# Patient Record
Sex: MALE | Race: WHITE | Employment: FULL TIME | URBAN - NONMETROPOLITAN AREA
[De-identification: names, ages, dates, MRNs, and addresses within clinical notes are randomized per-mention and may not be internally consistent; named-entity substitution may affect disease eponyms.]

---

## 2017-01-09 ENCOUNTER — OFFICE VISIT (OUTPATIENT)
Dept: INTERNAL MEDICINE | Age: 57
End: 2017-01-09

## 2017-01-09 ENCOUNTER — TELEPHONE (OUTPATIENT)
Dept: INTERNAL MEDICINE | Age: 57
End: 2017-01-09

## 2017-01-09 VITALS
BODY MASS INDEX: 29.04 KG/M2 | OXYGEN SATURATION: 96 % | WEIGHT: 191.6 LBS | HEART RATE: 88 BPM | HEIGHT: 68 IN | DIASTOLIC BLOOD PRESSURE: 68 MMHG | SYSTOLIC BLOOD PRESSURE: 122 MMHG

## 2017-01-09 DIAGNOSIS — I83.90 VARICOSE VEIN OF LEG: Primary | ICD-10-CM

## 2017-01-09 DIAGNOSIS — R73.01 IMPAIRED FASTING GLUCOSE: ICD-10-CM

## 2017-01-09 DIAGNOSIS — Z12.5 SCREENING FOR PROSTATE CANCER: ICD-10-CM

## 2017-01-09 DIAGNOSIS — E78.5 DYSLIPIDEMIA: Primary | ICD-10-CM

## 2017-01-09 PROCEDURE — 99202 OFFICE O/P NEW SF 15 MIN: CPT | Performed by: NURSE PRACTITIONER

## 2017-01-09 ASSESSMENT — ENCOUNTER SYMPTOMS
COLOR CHANGE: 0
BACK PAIN: 0

## 2017-03-14 ENCOUNTER — OFFICE VISIT (OUTPATIENT)
Dept: INTERNAL MEDICINE | Age: 57
End: 2017-03-14
Payer: COMMERCIAL

## 2017-03-14 VITALS
WEIGHT: 193.4 LBS | SYSTOLIC BLOOD PRESSURE: 108 MMHG | HEIGHT: 68 IN | BODY MASS INDEX: 29.31 KG/M2 | HEART RATE: 76 BPM | DIASTOLIC BLOOD PRESSURE: 60 MMHG

## 2017-03-14 DIAGNOSIS — Z12.5 SCREENING FOR PROSTATE CANCER: ICD-10-CM

## 2017-03-14 DIAGNOSIS — Z11.4 SCREENING FOR HIV WITHOUT PRESENCE OF RISK FACTORS: ICD-10-CM

## 2017-03-14 DIAGNOSIS — Z00.00 ENCOUNTER FOR WELLNESS EXAMINATION: Primary | ICD-10-CM

## 2017-03-14 DIAGNOSIS — Z11.59 NEED FOR HEPATITIS C SCREENING TEST: ICD-10-CM

## 2017-03-14 DIAGNOSIS — S29.019A THORACIC MYOFASCIAL STRAIN, INITIAL ENCOUNTER: ICD-10-CM

## 2017-03-14 DIAGNOSIS — R73.01 IMPAIRED FASTING GLUCOSE: ICD-10-CM

## 2017-03-14 DIAGNOSIS — G89.29 CHRONIC PAIN OF LEFT KNEE: ICD-10-CM

## 2017-03-14 DIAGNOSIS — M25.562 CHRONIC PAIN OF LEFT KNEE: ICD-10-CM

## 2017-03-14 DIAGNOSIS — E78.5 DYSLIPIDEMIA: ICD-10-CM

## 2017-03-14 PROCEDURE — 99396 PREV VISIT EST AGE 40-64: CPT | Performed by: INTERNAL MEDICINE

## 2017-03-14 ASSESSMENT — ENCOUNTER SYMPTOMS
SORE THROAT: 0
SHORTNESS OF BREATH: 0
ABDOMINAL PAIN: 0
WHEEZING: 0
EYE PAIN: 0
DOUBLE VISION: 0
NAUSEA: 0
BLOOD IN STOOL: 0
VOMITING: 0
DIARRHEA: 0
BLURRED VISION: 0
CONSTIPATION: 0
EYE DISCHARGE: 0
COUGH: 0

## 2018-03-01 ENCOUNTER — HOSPITAL ENCOUNTER (OUTPATIENT)
Dept: LAB | Age: 58
Setting detail: SPECIMEN
Discharge: HOME | End: 2018-03-01
Payer: COMMERCIAL

## 2018-03-01 DIAGNOSIS — Z11.59 NEED FOR HEPATITIS C SCREENING TEST: ICD-10-CM

## 2018-03-01 DIAGNOSIS — Z12.5 SCREENING FOR PROSTATE CANCER: ICD-10-CM

## 2018-03-01 DIAGNOSIS — R73.01 IMPAIRED FASTING GLUCOSE: ICD-10-CM

## 2018-03-01 DIAGNOSIS — Z11.4 SCREENING FOR HIV WITHOUT PRESENCE OF RISK FACTORS: ICD-10-CM

## 2018-03-01 DIAGNOSIS — E78.5 DYSLIPIDEMIA: ICD-10-CM

## 2018-03-01 LAB
ANION GAP SERPL CALCULATED.3IONS-SCNC: 12 MMOL/L (ref 9–17)
BUN BLDV-MCNC: 22 MG/DL (ref 6–20)
BUN/CREAT BLD: 27 (ref 9–20)
CALCIUM SERPL-MCNC: 9.5 MG/DL (ref 8.6–10.4)
CHLORIDE BLD-SCNC: 101 MMOL/L (ref 98–107)
CHOLESTEROL/HDL RATIO: 3.5
CHOLESTEROL: 196 MG/DL
CO2: 29 MMOL/L (ref 20–31)
CREAT SERPL-MCNC: 0.81 MG/DL (ref 0.7–1.2)
GFR AFRICAN AMERICAN: >60 ML/MIN
GFR NON-AFRICAN AMERICAN: >60 ML/MIN
GFR SERPL CREATININE-BSD FRML MDRD: ABNORMAL ML/MIN/{1.73_M2}
GFR SERPL CREATININE-BSD FRML MDRD: ABNORMAL ML/MIN/{1.73_M2}
GLUCOSE BLD-MCNC: 112 MG/DL (ref 70–99)
HDLC SERPL-MCNC: 56 MG/DL
HEPATITIS C ANTIBODY: NONREACTIVE
HIV AG/AB: NONREACTIVE
LDL CHOLESTEROL: 124 MG/DL (ref 0–130)
POTASSIUM SERPL-SCNC: 4.6 MMOL/L (ref 3.7–5.3)
PROSTATE SPECIFIC ANTIGEN: 3.38 UG/L
SODIUM BLD-SCNC: 142 MMOL/L (ref 135–144)
TRIGL SERPL-MCNC: 81 MG/DL
VLDLC SERPL CALC-MCNC: NORMAL MG/DL (ref 1–30)

## 2018-03-01 PROCEDURE — 80048 BASIC METABOLIC PNL TOTAL CA: CPT

## 2018-03-01 PROCEDURE — 80061 LIPID PANEL: CPT

## 2018-03-01 PROCEDURE — G0103 PSA SCREENING: HCPCS

## 2018-03-01 PROCEDURE — 86803 HEPATITIS C AB TEST: CPT

## 2018-03-01 PROCEDURE — 87389 HIV-1 AG W/HIV-1&-2 AB AG IA: CPT

## 2018-03-01 PROCEDURE — 36415 COLL VENOUS BLD VENIPUNCTURE: CPT

## 2018-03-05 ENCOUNTER — OFFICE VISIT (OUTPATIENT)
Dept: INTERNAL MEDICINE | Age: 58
End: 2018-03-05
Payer: COMMERCIAL

## 2018-03-05 VITALS
SYSTOLIC BLOOD PRESSURE: 106 MMHG | BODY MASS INDEX: 30.46 KG/M2 | DIASTOLIC BLOOD PRESSURE: 74 MMHG | WEIGHT: 201 LBS | HEIGHT: 68 IN | HEART RATE: 84 BPM

## 2018-03-05 DIAGNOSIS — G89.29 CHRONIC PAIN OF LEFT KNEE: ICD-10-CM

## 2018-03-05 DIAGNOSIS — Z00.00 ENCOUNTER FOR WELLNESS EXAMINATION: Primary | ICD-10-CM

## 2018-03-05 DIAGNOSIS — L98.9 SKIN LESION: ICD-10-CM

## 2018-03-05 DIAGNOSIS — I83.92 VARICOSE VEINS OF LEFT LOWER EXTREMITY: ICD-10-CM

## 2018-03-05 DIAGNOSIS — M25.562 CHRONIC PAIN OF LEFT KNEE: ICD-10-CM

## 2018-03-05 DIAGNOSIS — R97.20 INCREASED PROSTATE SPECIFIC ANTIGEN (PSA) VELOCITY: ICD-10-CM

## 2018-03-05 DIAGNOSIS — E78.5 DYSLIPIDEMIA: ICD-10-CM

## 2018-03-05 DIAGNOSIS — H02.9 LESION OF EYELID: Primary | ICD-10-CM

## 2018-03-05 DIAGNOSIS — R73.01 IMPAIRED FASTING GLUCOSE: ICD-10-CM

## 2018-03-05 PROCEDURE — 99396 PREV VISIT EST AGE 40-64: CPT | Performed by: INTERNAL MEDICINE

## 2018-03-05 ASSESSMENT — PATIENT HEALTH QUESTIONNAIRE - PHQ9
1. LITTLE INTEREST OR PLEASURE IN DOING THINGS: 0
SUM OF ALL RESPONSES TO PHQ9 QUESTIONS 1 & 2: 1
SUM OF ALL RESPONSES TO PHQ QUESTIONS 1-9: 1
2. FEELING DOWN, DEPRESSED OR HOPELESS: 1

## 2018-03-05 NOTE — PROGRESS NOTES
The 10-year ASCVD risk score (Vivienne Pinto et al., 2013) is: 4.3%    Values used to calculate the score:      Age: 62 years      Sex: Male      Is Non- : No      Diabetic: No      Tobacco smoker: No      Systolic Blood Pressure: 640 mmHg      Is BP treated: No      HDL Cholesterol: 56 mg/dL      Total Cholesterol: 196 mg/dL

## 2018-03-07 NOTE — PROGRESS NOTES
Marleni Miranda  YOB: 1960    Date of Service:  3/5/2018      HPI:  Alex Husain was seen in the internal medicine office today for:  Chief Complaint  Patient presents with  · Varicose vein. · Knee pain. · Skin lesion. · Sugar problem. · Cholesterol problem. · PSA increase. · and continued evaluation and management of chronic medical problems. We talked about the blood sugar. It is up slightly at 112. He was somewhat overwhelmed with work with 2 big school projects and he feels he neglected himself. He did not do his spin class so his weight is up about 8 pounds and the sugar is up a little. We talked about the importance of diet and exercise. We talked about the cholesterol and looked at his 10-year risk. He is running just over 4%. We talked about diet and exercise. We reviewed the PSA which is up to 3.38. That is about a 0.86 increase over the last 12 months which is a little more increase in the PSA than we would really like to see although it is still well within the normal range. We talked about this. He has a lesion underneath his right eye that he wanted to have looked at that seems to be getting bigger to him. It is dark and just near the medial margin. He has had quite a bit of knee pain and he had the anterior cruciate ligament repaired about 18 years ago and wonders about having the knee looked at again. He has had a varicose vein in the left calf that is bothering him increasingly and he wanted to have that looked at. We talked about diet, exercise and weight loss. We reviewed vaccines. Review of Systems:  Constitutional:  Negative for chills, fever, and weight loss. HENT:  Negative for congestion, ear pain, and sore throat. Eyes:  Negative for blurred vision, double vision, pain and discharge. Respiratory:  Negative for cough, shortness of breath, and wheezing.   Cardiovascular:  Negative for chest pain, palpitations, and PND.  Gastrointestinal:  Negative for abdominal pain, blood in stool, constipation, diarrhea, nausea and vomiting. Genitourinary:  Negative for dysuria, frequency, and hematuria. Musculoskeletal:  Positive for knee pain and leg pain. Skin:  Negative for rash. Positive for skin lesion. Neurological:  Negative for tingling, sensory change, speech change, focal weakness, seizures, and headaches. Endo/Heme/Allergies:  Does not bruise/bleed easily. Psychiatric/Behavioral:  Negative for hallucinations and suicidal ideas. Patient Active Problem List   Diagnosis    Dyslipidemia    Impaired fasting glucose    Varicose veins of left lower extremity    Chronic pain of left knee    Increased prostate specific antigen (PSA) velocity       Medications:    Current Outpatient Prescriptions   Medication Sig Dispense Refill    ibuprofen (ADVIL;MOTRIN) 200 MG tablet Take 200 mg by mouth every 6 hours as needed.  Acetaminophen (TYLENOL PO) Take  by mouth as needed. No current facility-administered medications for this visit. Past Medical History:        Diagnosis Date    Carpal tunnel syndrome     EMG 11/07, bilaterally, right greater than left, as well as left ulnar neuropathy.  Dyslipidemia     Mild.  Impaired fasting glucose        Family Hx and Social Hx    family history includes Diabetes in his father; Heart Attack in his father; Prostate Cancer in his brother. History   Smoking Status    Never Smoker   Smokeless Tobacco    Never Used     History   Alcohol Use    Yes     Comment: approximately 1 beer per day       Vitals:    03/05/18 0854   BP: 106/74   Site: Right Arm   Position: Sitting   Cuff Size: Large Adult   Pulse: 84   Weight: 201 lb (91.2 kg)   Height: 5' 7.5\" (1.715 m)        Vitals Reviewed. Body mass index is 31.02 kg/m².      Wt Readings from Last 3 Encounters:   03/05/18 201 lb (91.2 kg)   03/14/17 193 lb 6.4 oz (87.7 kg)   01/09/17 191 lb 9.6 oz (86.9 kg)     BP Readings from Last 3 Encounters:   03/05/18 106/74   03/14/17 108/60   01/09/17 122/68       Physical Examination:  Constitutional:  He appears well-developed and well-nourished. No distress. HENT:  Head: Normocephalic and atraumatic. Right Ear:  External ear normal.  Left Ear:  External ear normal.  Nose:  Nose normal.  Mouth/Throat:  Oropharynx is clear and moist.  Eyes: Conjunctivae and EOM are normal.  Pupils are equal, round, and reactive to light. Right eye exhibits no discharge. Left eye exhibits no discharge. No scleral icterus. Neck:  Normal range of motion. Neck supple. No JVD present. No tracheal deviation present. No thyromegaly present. Cardiovascular:  Normal rate, normal heart sounds, and intact distal pulses. Exam reveals no gallop. Pulmonary/Chest:  Effort normal and breath sounds normal.  No respiratory distress. He has no wheezes. He has no rales. Abdominal:  Soft. Normal aorta and bowel sounds are normal.  He exhibits no distension and no mass. There is no hepatosplenomegaly. There is no tenderness. There is no rebound and no guarding. Digital Rectal Exam:  Prostate is only mildly enlarged. There is no nodule. There is no rectal mass. Stool is brown. Musculoskeletal:  Normal range of motion. He exhibits no edema or tenderness. Lymphadenopathy:    He has no cervical adenopathy. Neurological:  He is alert. He has normal strength. No cranial nerve deficit or sensory deficit. He exhibits normal muscle tone. Skin:  Skin is warm and dry. No rash noted. He is not diaphoretic. No pallor. He has a 3 mm lesion just beneath the margin of the eye medially on the right. It is darkly pigmented, fairly well demarcated. Psychiatric:  He has a normal mood and affect.   His behavior is normal.  Judgment normal.     Lab Results   Component Value Date    K 4.6 03/01/2018    CREATININE 0.81 03/01/2018    HCT 46.8 03/02/2017    PSA 3.38 03/01/2018    GLUCOSE 112 03/01/2018    CALCIUM

## 2018-03-29 ENCOUNTER — OFFICE VISIT (OUTPATIENT)
Dept: VASCULAR SURGERY | Age: 58
End: 2018-03-29
Payer: COMMERCIAL

## 2018-03-29 VITALS
HEIGHT: 66 IN | BODY MASS INDEX: 32.95 KG/M2 | DIASTOLIC BLOOD PRESSURE: 82 MMHG | HEART RATE: 82 BPM | SYSTOLIC BLOOD PRESSURE: 110 MMHG | WEIGHT: 205 LBS

## 2018-03-29 DIAGNOSIS — I83.92 VARICOSE VEINS OF LEFT LOWER EXTREMITY: Primary | ICD-10-CM

## 2018-03-29 PROCEDURE — 1036F TOBACCO NON-USER: CPT | Performed by: SURGERY

## 2018-03-29 PROCEDURE — 99203 OFFICE O/P NEW LOW 30 MIN: CPT | Performed by: SURGERY

## 2018-03-29 PROCEDURE — 3017F COLORECTAL CA SCREEN DOC REV: CPT | Performed by: SURGERY

## 2018-03-29 PROCEDURE — G8427 DOCREV CUR MEDS BY ELIG CLIN: HCPCS | Performed by: SURGERY

## 2018-03-29 PROCEDURE — G8417 CALC BMI ABV UP PARAM F/U: HCPCS | Performed by: SURGERY

## 2018-03-29 PROCEDURE — G8484 FLU IMMUNIZE NO ADMIN: HCPCS | Performed by: SURGERY

## 2018-03-29 NOTE — PROGRESS NOTES
[id number] [facility]  [facility] VASCULAR SURG  [address] [facility] [address]  [address]  1960  62 y. o.male       Chief Complaint:  Chief Complaint   Patient presents with    Other     vericose veins left lower extremity        History of present Illness:  Pt is here today for evaluation and treatment of a left leg varicose vein. This is a 62 yr old male with several years of varicose veins in both legs, more prominent on the left. He notices aching and fatigue in the leg and has worn compression stockings. He feels that his left leg holds back his ability to exercise effectively. He has no prior history of DVT. We discussed that varicose veins are typically due to venous reflux disease. He does have a strong family history of heart attack and these same veins are used in bypass surgery. Any venous procedures will not affect his prior right knee surgery. Past Medical History:  has a past medical history of Carpal tunnel syndrome; Dyslipidemia; and Impaired fasting glucose. Past Surgical History:   Past Surgical History:   Procedure Laterality Date    ANTERIOR CRUCIATE LIGAMENT REPAIR Left 02/99    COLONOSCOPY  08/13/60    OTHER SURGICAL HISTORY  06/23/78    Verrucae vulgaris, palm of right hand. Social History:  reports that he has never smoked. He has never used smokeless tobacco. He reports that he drinks alcohol. Family History: family history includes Diabetes in his father; Heart Attack in his father; Prostate Cancer in his brother.     Review of Systems:   Constitutional:  negative for  fevers, chills, sweats, fatigue, and weight loss  HEENT:  negative for vision or hearing changes,   Respiratory:  negative for shortness of breath, cough, or congestion  Cardiovascular:  negative for  chest pain, palpitations  Gastrointestinal:  negative for nausea, vomiting, diarrhea, constipation, abdominal

## 2018-04-18 ENCOUNTER — HOSPITAL ENCOUNTER (OUTPATIENT)
Dept: INTERVENTIONAL RADIOLOGY/VASCULAR | Age: 58
Discharge: HOME | End: 2018-04-20
Payer: COMMERCIAL

## 2018-04-18 DIAGNOSIS — I83.893 VARICOSE VEINS OF BILATERAL LOWER EXTREMITIES WITH OTHER COMPLICATIONS: ICD-10-CM

## 2018-04-18 PROCEDURE — 93970 EXTREMITY STUDY: CPT

## 2018-04-26 ENCOUNTER — OFFICE VISIT (OUTPATIENT)
Dept: VASCULAR SURGERY | Age: 58
End: 2018-04-26
Payer: COMMERCIAL

## 2018-04-26 VITALS
WEIGHT: 203 LBS | DIASTOLIC BLOOD PRESSURE: 72 MMHG | BODY MASS INDEX: 32.62 KG/M2 | SYSTOLIC BLOOD PRESSURE: 100 MMHG | HEIGHT: 66 IN | HEART RATE: 60 BPM

## 2018-04-26 DIAGNOSIS — I87.2 VENOUS INSUFFICIENCY (CHRONIC) (PERIPHERAL): ICD-10-CM

## 2018-04-26 DIAGNOSIS — I83.92 VARICOSE VEINS OF LEFT LOWER EXTREMITY: Primary | ICD-10-CM

## 2018-04-26 PROCEDURE — 99213 OFFICE O/P EST LOW 20 MIN: CPT | Performed by: SURGERY

## 2018-04-26 PROCEDURE — G8427 DOCREV CUR MEDS BY ELIG CLIN: HCPCS | Performed by: SURGERY

## 2018-04-26 PROCEDURE — G8417 CALC BMI ABV UP PARAM F/U: HCPCS | Performed by: SURGERY

## 2018-04-26 PROCEDURE — 1036F TOBACCO NON-USER: CPT | Performed by: SURGERY

## 2018-04-26 PROCEDURE — 3017F COLORECTAL CA SCREEN DOC REV: CPT | Performed by: SURGERY

## 2018-08-06 ENCOUNTER — OFFICE VISIT (OUTPATIENT)
Dept: INTERNAL MEDICINE | Age: 58
End: 2018-08-06
Payer: COMMERCIAL

## 2018-08-06 VITALS
BODY MASS INDEX: 29.86 KG/M2 | WEIGHT: 197 LBS | HEART RATE: 84 BPM | DIASTOLIC BLOOD PRESSURE: 88 MMHG | SYSTOLIC BLOOD PRESSURE: 122 MMHG | HEIGHT: 68 IN

## 2018-08-06 DIAGNOSIS — M75.81 ROTATOR CUFF TENDINITIS, RIGHT: ICD-10-CM

## 2018-08-06 DIAGNOSIS — G89.29 CHRONIC PAIN OF LEFT KNEE: ICD-10-CM

## 2018-08-06 DIAGNOSIS — M25.562 CHRONIC PAIN OF LEFT KNEE: ICD-10-CM

## 2018-08-06 DIAGNOSIS — R73.01 IMPAIRED FASTING GLUCOSE: ICD-10-CM

## 2018-08-06 DIAGNOSIS — E78.5 DYSLIPIDEMIA: ICD-10-CM

## 2018-08-06 DIAGNOSIS — I83.92 VARICOSE VEINS OF LEFT LOWER EXTREMITY: ICD-10-CM

## 2018-08-06 DIAGNOSIS — R97.20 INCREASED PROSTATE SPECIFIC ANTIGEN (PSA) VELOCITY: ICD-10-CM

## 2018-08-06 DIAGNOSIS — H81.10 BENIGN PAROXYSMAL POSITIONAL VERTIGO, UNSPECIFIED LATERALITY: Primary | ICD-10-CM

## 2018-08-06 PROCEDURE — G8417 CALC BMI ABV UP PARAM F/U: HCPCS | Performed by: INTERNAL MEDICINE

## 2018-08-06 PROCEDURE — 99214 OFFICE O/P EST MOD 30 MIN: CPT | Performed by: INTERNAL MEDICINE

## 2018-08-06 PROCEDURE — 1036F TOBACCO NON-USER: CPT | Performed by: INTERNAL MEDICINE

## 2018-08-06 PROCEDURE — 3017F COLORECTAL CA SCREEN DOC REV: CPT | Performed by: INTERNAL MEDICINE

## 2018-08-06 PROCEDURE — G8427 DOCREV CUR MEDS BY ELIG CLIN: HCPCS | Performed by: INTERNAL MEDICINE

## 2018-08-06 RX ORDER — NAPROXEN 500 MG/1
500 TABLET ORAL 2 TIMES DAILY WITH MEALS
Qty: 28 TABLET | Refills: 0 | Status: SHIPPED | OUTPATIENT
Start: 2018-08-06 | End: 2019-03-11 | Stop reason: ALTCHOICE

## 2018-08-09 ENCOUNTER — HOSPITAL ENCOUNTER (OUTPATIENT)
Dept: PHYSICAL THERAPY | Age: 58
Setting detail: THERAPIES SERIES
Discharge: HOME | End: 2018-08-09
Payer: COMMERCIAL

## 2018-08-09 PROCEDURE — G8982 BODY POS GOAL STATUS: HCPCS

## 2018-08-09 PROCEDURE — 97162 PT EVAL MOD COMPLEX 30 MIN: CPT

## 2018-08-09 PROCEDURE — 97112 NEUROMUSCULAR REEDUCATION: CPT

## 2018-08-09 PROCEDURE — G8981 BODY POS CURRENT STATUS: HCPCS

## 2018-08-09 NOTE — PROGRESS NOTES
Physical Therapy  Initial Assessment  Date: 2018  Patient Name: Abiodun Maynard  MRN: [id number]  : 1960          Restrictions       Subjective   General  Referring Practitioner: Gustavo Sharma MD  Referral Date : 18  Diagnosis: H81.10 (ICD-10-CM) - Benign paroxysmal positional vertigo, unspecified laterality  General Comment  Comments: Dizziness 2/10 current, 10/10 with nausea. Patient reporting having near falls. PT Visit Information  PT Insurance Information: [employer] [address]  Subjective  Subjective: Insidious onset approx 2 weeks ago. Patient ntoing increased dizziness with sitting/standing and getting up. Difficulty with laying on Right side. Patient noting looking up for prolonged period and looking down. 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 Vision/Hearing       Orientation  Orientation  Overall Orientation Status: Within Functional Limits    Social/Functional History  Social/Functional History  Type of occupation:    Objective     Observation/Palpation  Posture: Fair               Additional Measures  Special Tests: +smooth pursuit, +saccades , Left [place] slight incresae in dizziness-no nystagmus noted, Right [place] late onset approx 15 sec and had moderate nystagmus with torsion. Balance  Comments: EO on firm and foam min sway, EC on firm min to mod sway, EC on Foam mod sway                          Assessment   Conditions Requiring Skilled Therapeutic Intervention  Body structures, Functions, Activity limitations: Decreased functional mobility ; Decreased ADL status; Decreased balance  Assessment: + Right [place] greater than Left for BPPV, patient also has + VOR testing this date.    Prognosis: Good  Decision Making: Medium Complexity  REQUIRES PT FOLLOW UP: Yes  Activity Tolerance  Activity Tolerance: Patient Tolerated treatment well         Plan   Plan  Times per week: 1-2x/week   Plan weeks: 6weeks   Current Treatment Recommendations: Balance Training, Neuromuscular Re-education, Patient/Caregiver Education & Training, Home Exercise Program, Safety Education & Training    G-Code  PT G-Codes  Functional Assessment Tool Used: DHI   Score: 38/100=38% Disability   Functional Limitation: Changing and maintaining body position  Changing and Maintaining Body Position Current Status (): At least 20 percent but less than 40 percent impaired, limited or restricted  Changing and Maintaining Body Position Goal Status (): At least 1 percent but less than 20 percent impaired, limited or restricted    OutComes Score                                           Goals  Short term goals  Time Frame for Short term goals: 3 weeks   Short term goal 1: Perform CRM   Long term goals  Time Frame for Long term goals : 6weeks  Long term goal 1: [place] in HEP   Long term goal 2: Patient to be able to lay on the Right Side without dizziness. 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 Long term goal 4: DHI 20/100 or less to improve overall function.    Patient Goals   Patient goals : Get Rid of Dizziness        Therapy Time   Individual Concurrent Group Co-treatment   Time In 0710         Time Out 0750         Minutes 40         Timed Code Treatment Minutes: 8 Minutes       Migdalia Dos Santos, PT

## 2018-08-09 NOTE — FLOWSHEET NOTE
kinesthetic sense, posture, motor skill, proprioception. (59671)    Therapeutic Activities:     [] Therapeutic activities, direct (one-on-one) patient contact (use of dynamic activities to improve functional performance). (10235)    Gait:   [] Provided training and instruction to the patient for ambulation re-education. (60317)    Self-Care/ADL's  [] Self-care/home management training and compensatory training, meal preparation, safety procedures, and instructions in use of assistive technology devices/adaptive equipment, direct one-on-one contact. (33773)    Home Exercise Program:     [] Reviewed/Progressed HEP activities related to strengthening, flexibility, endurance, ROM. (97957)  [] Reviewed/Progressed HEP activities related to improving balance, coordination, kinesthetic sense, posture, motor skill, proprioception.  (04281)    Manual Treatments:    [] Provided manual therapy to mobilize soft tissue/joints for the purpose of modulating pain, promoting relaxation,  increasing ROM, reducing/eliminating soft tissue swelling/inflammation/restriction, improving soft tissue extensibility. (17692)    Service Based Modalities:      Timed Code Treatment Minutes:   8'    Total Treatment Minutes:       Treatment/Activity Tolerance:  [] Patient tolerated treatment well [] Patient limited by fatique  [] Patient limited by pain  [] Patient limited by other medical complications  [x] Other: Dizziness  Prognosis: [x] Good [] Fair  [] Poor    Patient Requires Follow-up: [x] Yes  [] No      Goals:  Short term goals  Time Frame for Short term goals: 3 weeks   Short term goal 1: Perform CRM     Long term goals  Time Frame for Long term goals : 6weeks  Long term goal 1: [place] in HEP   Long term goal 2: Patient to be able to lay on the Right Side without dizziness. 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   Long term goal 4: DHI 20/100 or less to improve overall function.            Plan:   [] Continue per plan of care [] Alter current plan (see comments)  [x] Plan of care initiated [] Hold pending MD visit [] Discharge  Plan for Next Session:  Assess Right [facility].   Electronically signed by:  José Royal

## 2018-08-14 ENCOUNTER — HOSPITAL ENCOUNTER (OUTPATIENT)
Dept: PHYSICAL THERAPY | Age: 58
Setting detail: THERAPIES SERIES
Discharge: HOME | End: 2018-08-14
Payer: COMMERCIAL

## 2018-08-14 DIAGNOSIS — M25.512 LEFT SHOULDER PAIN, UNSPECIFIED CHRONICITY: Primary | ICD-10-CM

## 2018-08-14 PROCEDURE — 97112 NEUROMUSCULAR REEDUCATION: CPT

## 2018-08-14 NOTE — FLOWSHEET NOTE
Physical Therapy Daily Treatment Note    Date:  2018    Patient Name:  Charito Scott    :  1960  MRN: [id number]  Restrictions/Precautions:     Medical/Treatment Diagnosis Information:   · Diagnosis: H81.10 (ICD-10-CM) - Benign paroxysmal positional vertigo, unspecified laterality     Insurance/Certification information:  PT Insurance Information: [employer] [address]  Physician Information:  Referring Practitioner: Reza Garcia MD  Plan of care signed (Y/N):  n  Visit# / total visits: 1 /10  Pain level: /10     G-Code (if applicable):      Date G-Code Applied:  18  PT G-Codes  Functional Assessment Tool Used: DHI   Score: 38/100=38% Disability   Functional Limitation: Changing and maintaining body position  Changing and Maintaining Body Position Current Status (): At least 20 percent but less than 40 percent impaired, limited or restricted  Changing and Maintaining Body Position Goal Status (): At least 1 percent but less than 20 percent impaired, limited or restricted    Time In:1335  Time Out:1350  Progress Note: [x]  Yes  []  No  Next due by: Visit #10      Subjective:  Patient reporting 95% overall imrpovement since eval.  Patient noting no spinning sensation since the day after the eval.    Objective: VEDA per flow sheet. Patient had - Malou Ghotra this date. Performed Right Bradt Daroff with slight symptoms upon return to sitting. Patient states\"feels like his normal\". Patient educated on HEP, patient to complete 2x2 x 1 week, then 1x1 x 1 week, then 1x1 every other day x 1 week, then 1x/week.      Observation:   Test measurements:      Exercises:   Exercise/Equipment Resistance/Repetitions Other comments        Right LANETTE Rojas  2x2 on Right                                                             [] Provided verbal/tactile cueing for activities related to strengthening, flexibility, endurance, ROM. (71375)  [x] Provided verbal/tactile cueing for activities related to improving balance, coordination, kinesthetic sense, posture, motor skill, proprioception. (24293)    Therapeutic Activities:     [] Therapeutic activities, direct (one-on-one) patient contact (use of dynamic activities to improve functional performance). (03420)    Gait:   [] Provided training and instruction to the patient for ambulation re-education. (85847)    Self-Care/ADL's  [] Self-care/home management training and compensatory training, meal preparation, safety procedures, and instructions in use of assistive technology devices/adaptive equipment, direct one-on-one contact. (81778)    Home Exercise Program:     [] Reviewed/Progressed HEP activities related to strengthening, flexibility, endurance, ROM. (70020)  [] Reviewed/Progressed HEP activities related to improving balance, coordination, kinesthetic sense, posture, motor skill, proprioception.  (02667)    Manual Treatments:    [] Provided manual therapy to mobilize soft tissue/joints for the purpose of modulating pain, promoting relaxation,  increasing ROM, reducing/eliminating soft tissue swelling/inflammation/restriction, improving soft tissue extensibility.  (87879)    Service Based Modalities:      Timed Code Treatment Minutes:   15'    Total Treatment Minutes:   15'    Treatment/Activity Tolerance:  [x] Patient tolerated treatment well [] Patient limited by fatique  [] Patient limited by pain  [] Patient limited by other medical complications  [] Other: Dizziness  Prognosis: [x] Good [] Fair  [] Poor    Patient Requires Follow-up: [x] Yes  [x] No      Goals:  Short term goals  Time Frame for Short term goals: 3 weeks   Short term goal 1: Perform CRM (met)     Long term goals  Time Frame for Long term goals : 6weeks  Long term goal 1: [place] in HEP (met)  Long term goal 2: Patient to be able to lay on the Right Side without dizziness.(met)  Long term goal 3: Improved Balance to 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 Plan:   [] Continue per plan of care [] Alter current plan (see comments)  [x] Plan of care initiated [] Hold pending MD visit [] Discharge  Plan for Next Session:  Assess Right Shoulder next session, if receive new order. Hold x 2 weeks for vertigo.   Electronically signed by:  Alicia Osuna

## 2018-08-16 ENCOUNTER — HOSPITAL ENCOUNTER (OUTPATIENT)
Dept: PHYSICAL THERAPY | Age: 58
Setting detail: THERAPIES SERIES
Discharge: HOME | End: 2018-08-16
Payer: COMMERCIAL

## 2018-08-16 NOTE — PROGRESS NOTES
Physical Therapy    Outpatient Physical Therapy    [x] [place]  Phone: [phone]  Fax: [phone]      [] [place]  Phone: [phone]  Fax: [phone]    Physical Therapy  Cancellation/No-show Note  Patient Name:  Liu Horan  :  1960   Date:  2018  Cancelled visits to date: 1  No-shows to date:      For today's appointment patient:  [x]  Cancelled  []  Rescheduled appointment  []  No-show     Reason given by patient:  []  Patient ill  []  Conflicting appointment  []  No transportation    []  Conflict with work  []  No reason given  [x]  Other:   put him on another program.   Comments:      Electronically signed by: Luis Angel Contreras PT

## 2018-09-17 ENCOUNTER — HOSPITAL ENCOUNTER (OUTPATIENT)
Dept: LAB | Age: 58
Setting detail: SPECIMEN
Discharge: HOME | End: 2018-09-17
Payer: COMMERCIAL

## 2018-09-17 DIAGNOSIS — R73.01 IMPAIRED FASTING GLUCOSE: ICD-10-CM

## 2018-09-17 DIAGNOSIS — R97.20 INCREASED PROSTATE SPECIFIC ANTIGEN (PSA) VELOCITY: ICD-10-CM

## 2018-09-17 LAB
ESTIMATED AVERAGE GLUCOSE: 120 MG/DL
GLUCOSE FASTING: 98 MG/DL (ref 70–99)
HBA1C MFR BLD: 5.8 % (ref 4.8–5.9)
PROSTATE SPECIFIC ANTIGEN: 3.35 UG/L

## 2018-09-17 PROCEDURE — 36415 COLL VENOUS BLD VENIPUNCTURE: CPT

## 2018-09-17 PROCEDURE — 82947 ASSAY GLUCOSE BLOOD QUANT: CPT

## 2018-09-17 PROCEDURE — 83036 HEMOGLOBIN GLYCOSYLATED A1C: CPT

## 2018-09-17 PROCEDURE — 84153 ASSAY OF PSA TOTAL: CPT

## 2018-11-29 NOTE — DISCHARGE SUMMARY
[facility]    [x] [place]  Phone: [phone]  Fax: [phone]      [] [place]  Phone: [phone]  Fax: [phone]    Physical Therapy Discharge Note  Date: 2018        Patient Name:  Theresa Tracy    :  1960  MRN: [id number]  Medical/Treatment Diagnosis Information:   · Diagnosis: H81.10 (ICD-10-CM) - Benign paroxysmal positional vertigo, unspecified laterality  ·   Insurance/Certification information:  PT Insurance Information: [employer] [address]  Physician Information:  Referring Practitioner: Natalya Iraheta MD  Plan of care signed (Y/N):  n  Visit# / total visits: 1 /10  Pain level:      /10            G-Code (if applicable):                                             Date G-Code Applied:  18  PT G-Codes  Functional Assessment Tool Used: DHI   Score: 38/100=38% Disability   Functional Limitation: Changing and maintaining body position  Changing and Maintaining Body Position Current Status (): At least 20 percent but less than 40 percent impaired, limited or restricted  Changing and Maintaining Body Position Goal Status (): At least 1 percent but less than 20 percent impaired, limited or restricted     Time Period for Report: 18-18   Cancels/No-shows to date:  1    Plan of Care/Treatment to date:  [] Therapeutic Exercise    [] Modalities:  [] Therapeutic Activity     [] Ultrasound  [] Electrical Stimulation  [] Gait Training      [] Cervical Traction    [] Lumbar Traction  [x] Neuromuscular Re-education  [] Cold/hotpack [] Iontophoresis  [x] Instruction in HEP      Other:  [] Manual Therapy       []    [] Aquatic Therapy       []                    ? Subjective:    Patient reporting 95% overall imrpovement since eval.  Patient noting no spinning sensation since the day after the eval.    Objective: VEDA per flow sheet. Patient had Jamshid Barbaraon Speaks this date.   Performed Right Romy Josafat with slight symptoms upon return to sitting. Patient states\"feels like his normal\". Patient educated on HEP, patient to complete 2x2 x 1 week, then 1x1 x 1 week, then 1x1 every other day x 1 week, then 1x/week. Assessment:  Patient was placed on Hold x 2 weeks for vertigo and no return DC PT Services.       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               Discharge Prognosis: [] Excellent [x] Good [] Fair  [] Poor     Goal Status:  [] Achieved [x] Partially Achieved  [] Not Achieved       Electronically signed by:  Beth Pulido, PT

## 2019-03-11 ENCOUNTER — OFFICE VISIT (OUTPATIENT)
Dept: INTERNAL MEDICINE | Age: 59
End: 2019-03-11
Payer: COMMERCIAL

## 2019-03-11 VITALS
HEART RATE: 84 BPM | BODY MASS INDEX: 30.77 KG/M2 | HEIGHT: 68 IN | DIASTOLIC BLOOD PRESSURE: 76 MMHG | SYSTOLIC BLOOD PRESSURE: 122 MMHG | WEIGHT: 203 LBS

## 2019-03-11 DIAGNOSIS — G89.29 CHRONIC PAIN OF LEFT KNEE: Primary | ICD-10-CM

## 2019-03-11 DIAGNOSIS — M25.512 LEFT SHOULDER PAIN, UNSPECIFIED CHRONICITY: ICD-10-CM

## 2019-03-11 DIAGNOSIS — E78.5 DYSLIPIDEMIA: ICD-10-CM

## 2019-03-11 DIAGNOSIS — M25.562 CHRONIC PAIN OF LEFT KNEE: Primary | ICD-10-CM

## 2019-03-11 DIAGNOSIS — R73.01 IMPAIRED FASTING GLUCOSE: ICD-10-CM

## 2019-03-11 PROCEDURE — 99213 OFFICE O/P EST LOW 20 MIN: CPT | Performed by: INTERNAL MEDICINE

## 2019-03-11 RX ORDER — NAPROXEN SODIUM 220 MG
220 TABLET ORAL 2 TIMES DAILY PRN
COMMUNITY
End: 2021-01-25

## 2019-03-11 ASSESSMENT — PATIENT HEALTH QUESTIONNAIRE - PHQ9
SUM OF ALL RESPONSES TO PHQ QUESTIONS 1-9: 0
SUM OF ALL RESPONSES TO PHQ QUESTIONS 1-9: 0
SUM OF ALL RESPONSES TO PHQ9 QUESTIONS 1 & 2: 0
2. FEELING DOWN, DEPRESSED OR HOPELESS: 0
1. LITTLE INTEREST OR PLEASURE IN DOING THINGS: 0

## 2019-03-21 ENCOUNTER — HOSPITAL ENCOUNTER (OUTPATIENT)
Dept: LAB | Age: 59
Discharge: HOME | End: 2019-03-21
Payer: COMMERCIAL

## 2019-03-21 DIAGNOSIS — G89.29 CHRONIC PAIN OF LEFT KNEE: ICD-10-CM

## 2019-03-21 DIAGNOSIS — M25.562 CHRONIC PAIN OF LEFT KNEE: ICD-10-CM

## 2019-03-21 DIAGNOSIS — E78.5 DYSLIPIDEMIA: ICD-10-CM

## 2019-03-21 DIAGNOSIS — R73.01 IMPAIRED FASTING GLUCOSE: ICD-10-CM

## 2019-03-21 LAB
ABSOLUTE EOS #: 0.1 K/UL (ref 0–0.4)
ABSOLUTE IMMATURE GRANULOCYTE: ABNORMAL K/UL (ref 0–0.3)
ABSOLUTE LYMPH #: 0.7 K/UL (ref 1–4.8)
ABSOLUTE MONO #: 0.4 K/UL (ref 0.1–1.2)
ALBUMIN SERPL-MCNC: 4.7 G/DL (ref 3.5–5.2)
ALBUMIN/GLOBULIN RATIO: 1.7 (ref 1–2.5)
ALP BLD-CCNC: 71 U/L (ref 40–129)
ALT SERPL-CCNC: 17 U/L (ref 5–41)
ANION GAP SERPL CALCULATED.3IONS-SCNC: 10 MMOL/L (ref 9–17)
AST SERPL-CCNC: 15 U/L
BASOPHILS # BLD: 1 % (ref 0–2)
BASOPHILS ABSOLUTE: 0 K/UL (ref 0–0.2)
BILIRUB SERPL-MCNC: 0.59 MG/DL (ref 0.3–1.2)
BUN BLDV-MCNC: 21 MG/DL (ref 6–20)
BUN/CREAT BLD: 26 (ref 9–20)
CALCIUM SERPL-MCNC: 9.5 MG/DL (ref 8.6–10.4)
CHLORIDE BLD-SCNC: 105 MMOL/L (ref 98–107)
CHOLESTEROL/HDL RATIO: 3.3
CHOLESTEROL: 163 MG/DL
CO2: 29 MMOL/L (ref 20–31)
CREAT SERPL-MCNC: 0.81 MG/DL (ref 0.7–1.2)
DIFFERENTIAL TYPE: ABNORMAL
EOSINOPHILS RELATIVE PERCENT: 2 % (ref 1–8)
GFR AFRICAN AMERICAN: >60 ML/MIN
GFR NON-AFRICAN AMERICAN: >60 ML/MIN
GFR SERPL CREATININE-BSD FRML MDRD: ABNORMAL ML/MIN/{1.73_M2}
GFR SERPL CREATININE-BSD FRML MDRD: ABNORMAL ML/MIN/{1.73_M2}
GLUCOSE BLD-MCNC: 112 MG/DL (ref 70–99)
HCT VFR BLD CALC: 47 % (ref 41–53)
HDLC SERPL-MCNC: 50 MG/DL
HEMOGLOBIN: 15.4 G/DL (ref 13.5–17.5)
IMMATURE GRANULOCYTES: ABNORMAL %
LDL CHOLESTEROL: 98 MG/DL (ref 0–130)
LYMPHOCYTES # BLD: 15 % (ref 15–43)
MCH RBC QN AUTO: 29 PG (ref 26–34)
MCHC RBC AUTO-ENTMCNC: 32.8 G/DL (ref 31–37)
MCV RBC AUTO: 88.6 FL (ref 80–100)
MONOCYTES # BLD: 8 % (ref 6–14)
NRBC AUTOMATED: ABNORMAL PER 100 WBC
PDW BLD-RTO: 13.5 % (ref 11–14.5)
PLATELET # BLD: 267 K/UL (ref 140–450)
PLATELET ESTIMATE: ABNORMAL
PMV BLD AUTO: 8.4 FL (ref 6–12)
POTASSIUM SERPL-SCNC: 4.9 MMOL/L (ref 3.7–5.3)
RBC # BLD: 5.31 M/UL (ref 4.5–5.9)
RBC # BLD: ABNORMAL 10*6/UL
SEG NEUTROPHILS: 74 % (ref 44–74)
SEGMENTED NEUTROPHILS ABSOLUTE COUNT: 3.6 K/UL (ref 1.8–7.7)
SODIUM BLD-SCNC: 144 MMOL/L (ref 135–144)
TOTAL PROTEIN: 7.5 G/DL (ref 6.4–8.3)
TRIGL SERPL-MCNC: 77 MG/DL
VLDLC SERPL CALC-MCNC: NORMAL MG/DL (ref 1–30)
WBC # BLD: 4.9 K/UL (ref 3.5–11)
WBC # BLD: ABNORMAL 10*3/UL

## 2019-03-21 PROCEDURE — 85025 COMPLETE CBC W/AUTO DIFF WBC: CPT

## 2019-03-21 PROCEDURE — 80061 LIPID PANEL: CPT

## 2019-03-21 PROCEDURE — 36415 COLL VENOUS BLD VENIPUNCTURE: CPT

## 2019-03-21 PROCEDURE — 80053 COMPREHEN METABOLIC PANEL: CPT

## 2019-09-26 ENCOUNTER — OFFICE VISIT (OUTPATIENT)
Dept: VASCULAR SURGERY | Age: 59
End: 2019-09-26
Payer: COMMERCIAL

## 2019-09-26 VITALS
HEIGHT: 66 IN | WEIGHT: 187.8 LBS | DIASTOLIC BLOOD PRESSURE: 72 MMHG | BODY MASS INDEX: 30.18 KG/M2 | SYSTOLIC BLOOD PRESSURE: 104 MMHG | HEART RATE: 76 BPM

## 2019-09-26 DIAGNOSIS — I87.2 VENOUS INSUFFICIENCY (CHRONIC) (PERIPHERAL): Primary | ICD-10-CM

## 2019-09-26 PROCEDURE — 99213 OFFICE O/P EST LOW 20 MIN: CPT | Performed by: SURGERY

## 2019-09-26 NOTE — PROGRESS NOTES
femoral 2+   R popliteal 2+ L popliteal 2+   R posterior tibial 2+ L posterior tibial 2+   R dorsalis pedis 2+ L dorsalis pedis 2+     Labs:   Lab Results   Component Value Date    WBC 4.9 03/21/2019    HGB 15.4 03/21/2019    HCT 47.0 03/21/2019    MCV 88.6 03/21/2019     03/21/2019     Lab Results   Component Value Date     03/21/2019    K 4.9 03/21/2019     03/21/2019    CO2 29 03/21/2019    BUN 21 03/21/2019    CREATININE 0.81 03/21/2019    GLUCOSE 112 03/21/2019    CALCIUM 9.5 03/21/2019     Assessment:  1. 62 yr old male with bilateral varicose veins    1. Venous insufficiency (chronic) (peripheral)        Plan:   Plan for endovenous ablation of left GSV with phlebectomies. No orders of the defined types were placed in this encounter.           Electronically signed by Tahmina Becker MD on 9/26/2019 at 2:05 PM     Copy sent to Dr. Dalila Arias MD

## 2019-11-12 ENCOUNTER — OFFICE VISIT (OUTPATIENT)
Dept: INTERNAL MEDICINE | Age: 59
End: 2019-11-12
Payer: COMMERCIAL

## 2019-11-12 VITALS
HEART RATE: 76 BPM | SYSTOLIC BLOOD PRESSURE: 122 MMHG | DIASTOLIC BLOOD PRESSURE: 72 MMHG | HEIGHT: 66 IN | WEIGHT: 191 LBS | RESPIRATION RATE: 16 BRPM | BODY MASS INDEX: 30.7 KG/M2

## 2019-11-12 DIAGNOSIS — R73.01 IMPAIRED FASTING GLUCOSE: Primary | ICD-10-CM

## 2019-11-12 DIAGNOSIS — R97.20 INCREASED PROSTATE SPECIFIC ANTIGEN (PSA) VELOCITY: ICD-10-CM

## 2019-11-12 DIAGNOSIS — E78.5 DYSLIPIDEMIA: ICD-10-CM

## 2019-11-12 PROCEDURE — 99214 OFFICE O/P EST MOD 30 MIN: CPT | Performed by: INTERNAL MEDICINE

## 2020-01-02 ENCOUNTER — TELEPHONE (OUTPATIENT)
Dept: VASCULAR SURGERY | Age: 60
End: 2020-01-02

## 2020-01-02 NOTE — TELEPHONE ENCOUNTER
Please call patient back at [phone]. Wants to speak with nurse first in regards to scheduling varicose vein surgery.

## 2020-01-31 ENCOUNTER — HOSPITAL ENCOUNTER (OUTPATIENT)
Dept: PREADMISSION TESTING | Age: 60
Discharge: HOME | End: 2020-02-04
Payer: COMMERCIAL

## 2020-01-31 VITALS
HEIGHT: 68 IN | WEIGHT: 200 LBS | DIASTOLIC BLOOD PRESSURE: 87 MMHG | OXYGEN SATURATION: 99 % | HEART RATE: 70 BPM | RESPIRATION RATE: 16 BRPM | BODY MASS INDEX: 30.31 KG/M2 | SYSTOLIC BLOOD PRESSURE: 129 MMHG

## 2020-01-31 NOTE — H&P
History and Physical Service   [facility] - INPATIENT    HISTORY AND PHYSICAL EXAMINATION            Date of Evaluation: 1/31/2020  Patient name:  Lexy Pemberton  MRN:   [id number]  YOB: 1960  PCP:    Sylvia Stokes MD    History Obtained From:     Patient    History of Present Illness: This is Lexy Pemberton a 61 y.o. male who presents for a pre-admission testing appointment for an upcoming endoscopic radiofrequency left saphenous vein ablation with phlebectomies by Dr. Saran Gusman scheduled on 02/28/2020 at 0900 due to left painful varicose veins, venous insufficiency. The patient's chief complaint is constant, 2-8/10 pain from the left knee to the left ankle for the past 5-6 years. The pain is aggravated by climbing ladders. Pt takes Aleve for the left leg pain. Pt denies left leg edema and history of a DVT. Past Medical History:     Past Medical History:   Diagnosis Date    Arthritis     Carpal tunnel syndrome     EMG 11/07, bilaterally, right greater than left, as well as left ulnar neuropathy.  Dyslipidemia     Mild.  History of vertigo     2 years ago (Written 01/31/2020).  Impaired fasting glucose     Borderline diabetes. Diet controlled per pt.  PONV (postoperative nausea and vomiting)         Past Surgical History:     Past Surgical History:   Procedure Laterality Date    ANTERIOR CRUCIATE LIGAMENT REPAIR Left 02/99    COLONOSCOPY  08/13/60    OTHER SURGICAL HISTORY  06/23/78    Verrucae vulgaris, palm of right hand. Medications Prior to Admission:     Prior to Admission medications    Medication Sig Start Date End Date Taking? Authorizing Provider   naproxen sodium (ALEVE) 220 MG tablet Take 220 mg by mouth 2 times daily as needed for Pain    Historical Provider, MD        Allergies:     Patient has no known allergies. Social History:     Tobacco:    reports that he has never smoked.  He has never used smokeless tobacco.  Alcohol: Appearance:  Alert, well appearing, and in no acute distress. Obese. Mental status: Oriented to person, place, and time. Head: Normocephalic and atraumatic. Eye: No icterus, redness, pupils equal and reactive, extraocular eye movements intact, and conjunctiva clear. Ear: Hearing grossly intact. Nose: No drainage noted. Mouth: Mucous membranes moist.  Neck: Supple and no carotid bruits noted. Lungs: Bilateral equal air entry, clear to auscultation, no wheezing, rales or rhonchi, and normal effort. Cardiovascular: Normal rate, regular rhythm, no murmur, gallop, or rub. Abdomen: Soft, non-tender, non-distended, and active bowel sounds. Neurologic: Normal speech and cranial nerves II through XII grossly intact. Strength 5/5 bilaterally. Skin: No gross lesions, rashes, bruising, or bleeding on exposed skin area. Extremities: Left lower leg varicose veins. Posterior tibial pulses 2+ bilaterally. No pedal edema. No calf tenderness with palpation. Psych: Anxious. Investigations:      Laboratory Testing:  No results found for this or any previous visit (from the past 24 hour(s)). No results for input(s): HGB, HCT, WBC, MCV, PLATELET, NA, K, CL, CO2, BUN, CREATININE, GLUCOSE, INR, PROTIME, APTT, AST, ALT, LABALBU, HCG in the last 720 hours. Diagnosis:      1. Left painful varicose veins, venous insufficiency    Plans:     1.  Endoscopic radiofrequency left saphenous vein ablation with phlebectomies      SINTIA Soriano CNP  1/31/2020  11:38 AM

## 2020-01-31 NOTE — PRE-PROCEDURE INSTRUCTIONS
other questions regarding your procedure or the day of surgery, please call [phone]      _________________________  ____________________________  Signature (Patient)              Signature (Provider) & date

## 2020-02-27 ENCOUNTER — ANESTHESIA EVENT (OUTPATIENT)
Dept: OPERATING ROOM | Age: 60
End: 2020-02-27
Payer: COMMERCIAL

## 2020-02-28 ENCOUNTER — HOSPITAL ENCOUNTER (OUTPATIENT)
Age: 60
Setting detail: OUTPATIENT SURGERY
Discharge: HOME | End: 2020-02-28
Attending: SURGERY | Admitting: SURGERY
Payer: COMMERCIAL

## 2020-02-28 ENCOUNTER — ANESTHESIA (OUTPATIENT)
Dept: OPERATING ROOM | Age: 60
End: 2020-02-28
Payer: COMMERCIAL

## 2020-02-28 VITALS
OXYGEN SATURATION: 99 % | SYSTOLIC BLOOD PRESSURE: 124 MMHG | HEART RATE: 75 BPM | RESPIRATION RATE: 19 BRPM | HEIGHT: 68 IN | TEMPERATURE: 97.3 F | BODY MASS INDEX: 30.31 KG/M2 | WEIGHT: 200 LBS | DIASTOLIC BLOOD PRESSURE: 86 MMHG

## 2020-02-28 VITALS — DIASTOLIC BLOOD PRESSURE: 60 MMHG | SYSTOLIC BLOOD PRESSURE: 124 MMHG | OXYGEN SATURATION: 99 % | TEMPERATURE: 96.1 F

## 2020-02-28 LAB
ANION GAP SERPL CALCULATED.3IONS-SCNC: 13 MMOL/L (ref 9–17)
BUN BLDV-MCNC: 19 MG/DL (ref 6–20)
BUN/CREAT BLD: 19 (ref 9–20)
CALCIUM SERPL-MCNC: 9.8 MG/DL (ref 8.6–10.4)
CHLORIDE BLD-SCNC: 101 MMOL/L (ref 98–107)
CO2: 26 MMOL/L (ref 20–31)
CREAT SERPL-MCNC: 0.99 MG/DL (ref 0.7–1.2)
GFR AFRICAN AMERICAN: >60 ML/MIN
GFR NON-AFRICAN AMERICAN: >60 ML/MIN
GFR SERPL CREATININE-BSD FRML MDRD: ABNORMAL ML/MIN/{1.73_M2}
GFR SERPL CREATININE-BSD FRML MDRD: ABNORMAL ML/MIN/{1.73_M2}
GLUCOSE BLD-MCNC: 112 MG/DL (ref 70–99)
HCT VFR BLD CALC: 49.2 % (ref 40.7–50.3)
HEMOGLOBIN: 16.2 G/DL (ref 13–17)
MCH RBC QN AUTO: 29.3 PG (ref 25.2–33.5)
MCHC RBC AUTO-ENTMCNC: 32.9 G/DL (ref 28.4–34.8)
MCV RBC AUTO: 89 FL (ref 82.6–102.9)
NRBC AUTOMATED: 0 PER 100 WBC
PDW BLD-RTO: 12.6 % (ref 11.8–14.4)
PLATELET # BLD: 279 K/UL (ref 138–453)
PMV BLD AUTO: 9.9 FL (ref 8.1–13.5)
POTASSIUM SERPL-SCNC: 4.4 MMOL/L (ref 3.7–5.3)
RBC # BLD: 5.53 M/UL (ref 4.21–5.77)
SODIUM BLD-SCNC: 140 MMOL/L (ref 135–144)
WBC # BLD: 4.7 K/UL (ref 3.5–11.3)

## 2020-02-28 PROCEDURE — 3700000000 HC ANESTHESIA ATTENDED CARE: Performed by: SURGERY

## 2020-02-28 PROCEDURE — 6360000002 HC RX W HCPCS: Performed by: SPECIALIST

## 2020-02-28 PROCEDURE — 3600000002 HC SURGERY LEVEL 2 BASE: Performed by: SURGERY

## 2020-02-28 PROCEDURE — 2709999900 HC NON-CHARGEABLE SUPPLY: Performed by: SURGERY

## 2020-02-28 PROCEDURE — 3700000001 HC ADD 15 MINUTES (ANESTHESIA): Performed by: SURGERY

## 2020-02-28 PROCEDURE — 85027 COMPLETE CBC AUTOMATED: CPT

## 2020-02-28 PROCEDURE — 7100000010 HC PHASE II RECOVERY - FIRST 15 MIN: Performed by: SURGERY

## 2020-02-28 PROCEDURE — 2580000003 HC RX 258: Performed by: SURGERY

## 2020-02-28 PROCEDURE — 3600000012 HC SURGERY LEVEL 2 ADDTL 15MIN: Performed by: SURGERY

## 2020-02-28 PROCEDURE — 7100000011 HC PHASE II RECOVERY - ADDTL 15 MIN: Performed by: SURGERY

## 2020-02-28 PROCEDURE — 37799 UNLISTED PX VASCULAR SURGERY: CPT | Performed by: SURGERY

## 2020-02-28 PROCEDURE — C1894 INTRO/SHEATH, NON-LASER: HCPCS | Performed by: SURGERY

## 2020-02-28 PROCEDURE — 75894 X-RAYS TRANSCATH THERAPY: CPT

## 2020-02-28 PROCEDURE — 36475 ENDOVENOUS RF 1ST VEIN: CPT | Performed by: SURGERY

## 2020-02-28 PROCEDURE — 80048 BASIC METABOLIC PNL TOTAL CA: CPT

## 2020-02-28 PROCEDURE — 2580000003 HC RX 258: Performed by: ANESTHESIOLOGY

## 2020-02-28 PROCEDURE — C1760 CLOSURE DEV, VASC: HCPCS | Performed by: SURGERY

## 2020-02-28 PROCEDURE — 2500000003 HC RX 250 WO HCPCS: Performed by: SURGERY

## 2020-02-28 RX ORDER — SODIUM CHLORIDE 0.9 % (FLUSH) 0.9 %
10 SYRINGE (ML) INJECTION EVERY 12 HOURS SCHEDULED
Status: DISCONTINUED | OUTPATIENT
Start: 2020-02-28 | End: 2020-02-28 | Stop reason: HOSPADM

## 2020-02-28 RX ORDER — PROPOFOL 10 MG/ML
INJECTION, EMULSION INTRAVENOUS PRN
Status: DISCONTINUED | OUTPATIENT
Start: 2020-02-28 | End: 2020-02-28 | Stop reason: SDUPTHER

## 2020-02-28 RX ORDER — SODIUM CHLORIDE 9 MG/ML
INJECTION, SOLUTION INTRAVENOUS CONTINUOUS
Status: DISCONTINUED | OUTPATIENT
Start: 2020-02-28 | End: 2020-02-28 | Stop reason: HOSPADM

## 2020-02-28 RX ORDER — HYDROMORPHONE HCL 110MG/55ML
0.25 PATIENT CONTROLLED ANALGESIA SYRINGE INTRAVENOUS EVERY 5 MIN PRN
Status: DISCONTINUED | OUTPATIENT
Start: 2020-02-28 | End: 2020-02-28 | Stop reason: HOSPADM

## 2020-02-28 RX ORDER — DEXAMETHASONE SODIUM PHOSPHATE 10 MG/ML
INJECTION INTRAMUSCULAR; INTRAVENOUS PRN
Status: DISCONTINUED | OUTPATIENT
Start: 2020-02-28 | End: 2020-02-28 | Stop reason: SDUPTHER

## 2020-02-28 RX ORDER — FENTANYL CITRATE 50 UG/ML
25 INJECTION, SOLUTION INTRAMUSCULAR; INTRAVENOUS EVERY 5 MIN PRN
Status: DISCONTINUED | OUTPATIENT
Start: 2020-02-28 | End: 2020-02-28 | Stop reason: HOSPADM

## 2020-02-28 RX ORDER — MIDAZOLAM HYDROCHLORIDE 1 MG/ML
INJECTION INTRAMUSCULAR; INTRAVENOUS PRN
Status: DISCONTINUED | OUTPATIENT
Start: 2020-02-28 | End: 2020-02-28 | Stop reason: SDUPTHER

## 2020-02-28 RX ORDER — ONDANSETRON 2 MG/ML
INJECTION INTRAMUSCULAR; INTRAVENOUS PRN
Status: DISCONTINUED | OUTPATIENT
Start: 2020-02-28 | End: 2020-02-28 | Stop reason: SDUPTHER

## 2020-02-28 RX ORDER — LIDOCAINE HYDROCHLORIDE 10 MG/ML
1 INJECTION, SOLUTION EPIDURAL; INFILTRATION; INTRACAUDAL; PERINEURAL
Status: DISCONTINUED | OUTPATIENT
Start: 2020-02-28 | End: 2020-02-28 | Stop reason: HOSPADM

## 2020-02-28 RX ORDER — ONDANSETRON 2 MG/ML
4 INJECTION INTRAMUSCULAR; INTRAVENOUS
Status: DISCONTINUED | OUTPATIENT
Start: 2020-02-28 | End: 2020-02-28 | Stop reason: HOSPADM

## 2020-02-28 RX ORDER — SODIUM CHLORIDE 0.9 % (FLUSH) 0.9 %
10 SYRINGE (ML) INJECTION PRN
Status: DISCONTINUED | OUTPATIENT
Start: 2020-02-28 | End: 2020-02-28 | Stop reason: HOSPADM

## 2020-02-28 RX ORDER — PROPOFOL 10 MG/ML
INJECTION, EMULSION INTRAVENOUS CONTINUOUS PRN
Status: DISCONTINUED | OUTPATIENT
Start: 2020-02-28 | End: 2020-02-28 | Stop reason: SDUPTHER

## 2020-02-28 RX ORDER — FENTANYL CITRATE 50 UG/ML
INJECTION, SOLUTION INTRAMUSCULAR; INTRAVENOUS PRN
Status: DISCONTINUED | OUTPATIENT
Start: 2020-02-28 | End: 2020-02-28 | Stop reason: SDUPTHER

## 2020-02-28 RX ORDER — SODIUM CHLORIDE, SODIUM LACTATE, POTASSIUM CHLORIDE, CALCIUM CHLORIDE 600; 310; 30; 20 MG/100ML; MG/100ML; MG/100ML; MG/100ML
INJECTION, SOLUTION INTRAVENOUS CONTINUOUS
Status: DISCONTINUED | OUTPATIENT
Start: 2020-02-28 | End: 2020-02-28 | Stop reason: HOSPADM

## 2020-02-28 RX ADMIN — DEXAMETHASONE SODIUM PHOSPHATE 10 MG: 10 INJECTION INTRAMUSCULAR; INTRAVENOUS at 08:51

## 2020-02-28 RX ADMIN — ONDANSETRON 4 MG: 2 INJECTION, SOLUTION INTRAMUSCULAR; INTRAVENOUS at 08:51

## 2020-02-28 RX ADMIN — PROPOFOL 70 MG: 10 INJECTION, EMULSION INTRAVENOUS at 08:45

## 2020-02-28 RX ADMIN — Medication 25 MCG: at 08:58

## 2020-02-28 RX ADMIN — MIDAZOLAM 2 MG: 1 INJECTION INTRAMUSCULAR; INTRAVENOUS at 08:42

## 2020-02-28 RX ADMIN — SODIUM CHLORIDE, POTASSIUM CHLORIDE, SODIUM LACTATE AND CALCIUM CHLORIDE: 600; 310; 30; 20 INJECTION, SOLUTION INTRAVENOUS at 08:40

## 2020-02-28 RX ADMIN — PROPOFOL 75 MCG/KG/MIN: 10 INJECTION, EMULSION INTRAVENOUS at 08:45

## 2020-02-28 RX ADMIN — SODIUM CHLORIDE, POTASSIUM CHLORIDE, SODIUM LACTATE AND CALCIUM CHLORIDE: 600; 310; 30; 20 INJECTION, SOLUTION INTRAVENOUS at 08:30

## 2020-02-28 RX ADMIN — Medication 25 MCG: at 09:31

## 2020-02-28 ASSESSMENT — PULMONARY FUNCTION TESTS
PIF_VALUE: 1
PIF_VALUE: 0
PIF_VALUE: 1
PIF_VALUE: 0
PIF_VALUE: 1
PIF_VALUE: 1
PIF_VALUE: 0
PIF_VALUE: 1

## 2020-02-28 ASSESSMENT — PAIN SCALES - GENERAL
PAINLEVEL_OUTOF10: 0

## 2020-02-28 ASSESSMENT — PAIN - FUNCTIONAL ASSESSMENT: PAIN_FUNCTIONAL_ASSESSMENT: 0-10

## 2020-02-28 NOTE — H&P
Left 02/99    COLONOSCOPY   08/13/60    OTHER SURGICAL HISTORY   06/23/78     Verrucae vulgaris, palm of right hand. Medications Prior to Admission:              Prior to Admission medications    Medication Sig Start Date End Date Taking? Authorizing Provider   naproxen sodium (ALEVE) 220 MG tablet Take 220 mg by mouth 2 times daily as needed for Pain       Historical Provider, MD         Allergies:      Patient has no known allergies. Social History:      Tobacco:    reports that he has never smoked. He has never used smokeless tobacco.  Alcohol:      reports current alcohol use. Drug Use:  has no history on file for drug. Functional Capacity:              1) Pt is able to walk 2 city blocks on level ground without SOB. 2) Pt is able to climb 2 flights of stairs without SOB. 3) Pt is able to walk up a hill for 1-2 city blocks without SOB. Family History:            Family History   Problem Relation Age of Onset    Heart Attack Father      Diabetes Father      Prostate Cancer Brother      Colon Cancer Neg Hx           Review of Systems:      Positive and Negative as described in HPI. CONSTITUTIONAL: Negative for fevers, chills, sweats, fatigue, and weight loss. HEENT: Pt wears contact lenses. Negative for hearing changes, rhinorrhea, and throat pain. RESPIRATORY: Negative for shortness of breath, cough, congestion, and wheezing. Pt denies history of respiratory disease. CARDIOVASCULAR: Negative for chest pain, blood clot, irregular heartbeat, and palpitations. GASTROINTESTINAL: Negative for reflux, nausea, vomiting, diarrhea, constipation, change in bowel habits, and abdominal pain. GENITOURINARY: Negative for difficulty of urination, burning with urination, and frequency. INTEGUMENT: Negative for rash, skin lesions, and easy bruising. HEMATOLOGIC/LYMPHATIC: Negative for swelling/edema.    ALLERGIC/IMMUNOLOGIC: Negative for urticaria and itching. ENDOCRINE: History of borderline diabetes. Negative for increase in thirst, increase in urination, and heat or cold intolerance. MUSCULOSKELETAL: Left knee pain. NEUROLOGICAL: History of vertigo- Last episode of vertigo was 2 years ago. Negative for headaches, dizziness, lightheadedness, numbness, and tingling extremities. Pt denies history of strokes and seizures. BEHAVIOR/PSYCH: Negative for depression and anxiety. Physical Exam:   /87   Pulse 70   Resp 16   Ht 5' 8\" (1.727 m)   Wt 200 lb (90.7 kg)   SpO2 99%   BMI 30.41 kg/m²   No results for input(s): POCGLU in the last 72 hours. General Appearance:  Alert, well appearing, and in no acute distress. Obese. Mental status: Oriented to person, place, and time. Head: Normocephalic and atraumatic. Eye: No icterus, redness, pupils equal and reactive, extraocular eye movements intact, and conjunctiva clear. Ear: Hearing grossly intact. Nose: No drainage noted. Mouth: Mucous membranes moist.  Neck: Supple and no carotid bruits noted. Lungs: Bilateral equal air entry, clear to auscultation, no wheezing, rales or rhonchi, and normal effort. Cardiovascular: Normal rate, regular rhythm, no murmur, gallop, or rub. Abdomen: Soft, non-tender, non-distended, and active bowel sounds. Neurologic: Normal speech and cranial nerves II through XII grossly intact. Strength 5/5 bilaterally. Skin: No gross lesions, rashes, bruising, or bleeding on exposed skin area. Extremities: Left lower leg varicose veins. Posterior tibial pulses 2+ bilaterally. No pedal edema. No calf tenderness with palpation. Psych: Anxious. Investigations:       Laboratory Testing:  No results found for this or any previous visit (from the past 24 hour(s)). No results for input(s): HGB, HCT, WBC, MCV, PLATELET, NA, K, CL, CO2, BUN, CREATININE, GLUCOSE, INR, PROTIME, APTT, AST, ALT, LABALBU, HCG in the last 720 hours. Diagnosis:       1.  Left painful

## 2020-02-28 NOTE — ANESTHESIA PRE PROCEDURE
Department of Anesthesiology  Preprocedure Note       Name:  Heike Heredia   Age:  61 y.o.  :  1960                                          MRN:  [id number]         Date:  2020      Surgeon: Shonda Childers):  Claudio Salguero MD    Procedure: LEFT SAPHENOUS VEIN ABLATION  WITH PHLEBECTOMIES  RADIOFREQUENCY ENDOSCOPIC-  VAS TECH (Left )    Medications prior to admission:   Prior to Admission medications    Medication Sig Start Date End Date Taking?  Authorizing Provider   naproxen sodium (ALEVE) 220 MG tablet Take 220 mg by mouth 2 times daily as needed for Pain   Yes Historical Provider, MD       Current medications:    Current Facility-Administered Medications   Medication Dose Route Frequency Provider Last Rate Last Dose    0.9 % sodium chloride infusion   Intravenous Continuous Chrissy Suero MD        lactated ringers infusion   Intravenous Continuous Chrissy Suero MD        sodium chloride flush 0.9 % injection 10 mL  10 mL Intravenous 2 times per day Will Danielle MD        sodium chloride flush 0.9 % injection 10 mL  10 mL Intravenous PRN Chrissy Suero MD        lidocaine PF 1 % injection 1 mL  1 mL Intradermal Once PRN Will Danielle MD        fentaNYL (SUBLIMAZE) injection 25 mcg  25 mcg Intravenous Q5 Min PRN Loli Barr MD        HYDROmorphone (DILAUDID) injection 0.25 mg  0.25 mg Intravenous Q5 Min PRN Loli Barr MD        [facility]etron [facility]) injection 4 mg  4 mg Intravenous Once PRN Orlin Zhao MD           Allergies:  No Known Allergies    Problem List:    Patient Active Problem List   Diagnosis Code    Dyslipidemia E78.5    Impaired fasting glucose R73.01    Varicose veins of left lower extremity I83.92    Chronic pain of left knee M25.562, G89.29    Increased prostate specific antigen (PSA) velocity R97.20    Venous insufficiency (chronic) (peripheral) I87.2       Past Medical History:        Diagnosis Date    Arthritis     Carpal tunnel syndrome EMG 11/07, bilaterally, right greater than left, as well as left ulnar neuropathy.  Dyslipidemia     Mild.  History of vertigo     2 years ago (Written 01/31/2020).  Impaired fasting glucose     Borderline diabetes. Diet controlled per pt.  PONV (postoperative nausea and vomiting)        Past Surgical History:        Procedure Laterality Date    ANTERIOR CRUCIATE LIGAMENT REPAIR Left 02/99    COLONOSCOPY  08/13/60    OTHER SURGICAL HISTORY  06/23/78    Verrucae vulgaris, palm of right hand. Social History:    Social History     Tobacco Use    Smoking status: Never Smoker    Smokeless tobacco: Never Used   Substance Use Topics    Alcohol use: Yes     Comment: social                                Counseling given: Not Answered      Vital Signs (Current):   Vitals:    02/28/20 0744 02/28/20 0753   BP: 126/77    Pulse: 82    Resp: 18    Temp: 97.7 °F (36.5 °C)    TempSrc: Oral    SpO2: 98%    Weight:  200 lb (90.7 kg)   Height:  5' 8\" (1.727 m)                                              BP Readings from Last 3 Encounters:   02/28/20 126/77   01/31/20 129/87   11/12/19 122/72       NPO Status: Time of last liquid consumption: 1900                        Time of last solid consumption: 1830                        Date of last liquid consumption: 02/27/20                        Date of last solid food consumption: 02/27/20    BMI:   Wt Readings from Last 3 Encounters:   02/28/20 200 lb (90.7 kg)   01/31/20 200 lb (90.7 kg)   11/12/19 191 lb (86.6 kg)     Body mass index is 30.41 kg/m².     CBC:   Lab Results   Component Value Date    WBC 4.9 03/21/2019    RBC 5.31 03/21/2019    HGB 15.4 03/21/2019    HCT 47.0 03/21/2019    MCV 88.6 03/21/2019    RDW 13.5 03/21/2019     03/21/2019       CMP:   Lab Results   Component Value Date     03/21/2019    K 4.9 03/21/2019     03/21/2019    CO2 29 03/21/2019    BUN 21 03/21/2019    CREATININE 0.81 03/21/2019    GFRAA >60 03/21/2019 LABGLOM >60 03/21/2019    GLUCOSE 112 03/21/2019    PROT 7.5 03/21/2019    CALCIUM 9.5 03/21/2019    BILITOT 0.59 03/21/2019    ALKPHOS 71 03/21/2019    AST 15 03/21/2019    ALT 17 03/21/2019       POC Tests: No results for input(s): POCGLU, POCNA, POCK, POCCL, POCBUN, POCHEMO, POCHCT in the last 72 hours. Coags: No results found for: PROTIME, INR, APTT    HCG (If Applicable): No results found for: PREGTESTUR, PREGSERUM, HCG, HCGQUANT     ABGs: No results found for: PHART, PO2ART, UKN8SRF, QEW8DMZ, BEART, Q2WVTXRK     Type & Screen (If Applicable):  No results found for: LABABO, LABRH    Anesthesia Evaluation     history of anesthetic complications: PONV. Airway: Mallampati: II  TM distance: >3 FB   Neck ROM: full  Mouth opening: > = 3 FB Dental:          Pulmonary:Negative Pulmonary ROS and normal exam                               Cardiovascular:Negative CV ROS  Exercise tolerance: good (>4 METS),       (-)  angina       Beta Blocker:  Not on Beta Blocker         Neuro/Psych:   Negative Neuro/Psych ROS              GI/Hepatic/Renal:        (-) PUD       Endo/Other: Negative Endo/Other ROS                    Abdominal:           Vascular: negative vascular ROS. Anesthesia Plan      TIVA     ASA 2       Induction: intravenous. MIPS: Postoperative opioids intended and Prophylactic antiemetics administered. Anesthetic plan and risks discussed with patient. Plan discussed with CRNA.     Attending anesthesiologist reviewed and agrees with Dulce Manzo MD   2/28/2020

## 2020-02-28 NOTE — BRIEF OP NOTE
Brief Postoperative Note  ______________________________________________________________    Patient: Joshua Cummings  YOB: 1960  MRN: [id number]  Date of Procedure: 2/28/2020    Pre-Op Diagnosis: DX LEFT VARICOSE VEINS PAINFUL , VENOUS INSUFF    Post-Op Diagnosis: Same       Procedure(s):  LEFT SAPHENOUS VEIN ABLATION  WITH PHLEBECTOMIES  RADIOFREQUENCY ENDOSCOPIC-  VAS TECH    Anesthesia: TIVA    Surgeon(s):  Yoan Saleh MD    Assistant: none    Estimated Blood Loss (mL): less than 50     Complications: None    Specimens:   * No specimens in log *    Implants:  * No implants in log *      Drains: * No LDAs found *    Findings:   No evidence of DVT in the left common femoral vein and no flow in the great saphenous vein after ablation    Yoan Saleh MD  Date: 2/28/2020  Time: 9:52 AM

## 2020-02-28 NOTE — ANESTHESIA POSTPROCEDURE EVALUATION
Department of Anesthesiology  Postprocedure Note    Patient: Fito Irizarry  MRN: [id number]  YOB: 1960  Date of evaluation: 2/28/2020  Time:  10:16 AM     Procedure Summary     Date:  02/28/20 Room / Location:  [facility] OR 04 / [facility]    Anesthesia Start:  Uday Tao Anesthesia Stop:  4084    Procedure:  LEFT SAPHENOUS VEIN ABLATION  WITH PHLEBECTOMIES  RADIOFREQUENCY ENDOSCOPIC-  VAS TECH (Left ) Diagnosis:  (DX LEFT VARICOSE VEINS PAINFUL , VENOUS INSUFF)    Surgeon:  Amada Moffett MD Responsible Provider:  Brigido Hernandez MD    Anesthesia Type:  TIVA ASA Status:  2          Anesthesia Type: TIVA    Jackie Phase I:      Jackie Phase II: Jackie Score: 10    Last vitals: Reviewed and per EMR flowsheets.        Anesthesia Post Evaluation    Patient location during evaluation: PACU  Patient participation: complete - patient participated  Level of consciousness: awake  Airway patency: patent  Nausea & Vomiting: no nausea  Complications: no  Cardiovascular status: blood pressure returned to baseline  Respiratory status: acceptable  Hydration status: euvolemic

## 2020-02-28 NOTE — OP NOTE
of 5 stab phlebectomy incisions were used to interrupt and remove the veins. Steri-Strips were applied to close the phlebectomy sites. A 2 layer compression wrap was applied. The procedure was tolerated well without complication.

## 2020-03-04 ENCOUNTER — TELEPHONE (OUTPATIENT)
Dept: VASCULAR SURGERY | Age: 60
End: 2020-03-04

## 2020-03-04 NOTE — TELEPHONE ENCOUNTER
Call patient back Thursday 3/5/2020. He states he had a procedure on 2/28/2020with Dr Nery Fregoso. Patient states he thinks he is have an ultrasound first before he sees Elisha Jones again. CALL [phone]  He is going on vacation 3/6/2020 thru 3/17/2020.

## 2020-03-05 NOTE — TELEPHONE ENCOUNTER
Returned patients call and he is scheduled for 3/18/20 for left venous duplex and 3/26 with Dr. Daniel Parish. Pt voiced understanding.

## 2020-03-18 ENCOUNTER — HOSPITAL ENCOUNTER (OUTPATIENT)
Dept: INTERVENTIONAL RADIOLOGY/VASCULAR | Age: 60
Discharge: HOME | End: 2020-03-20
Payer: COMMERCIAL

## 2020-03-18 PROCEDURE — 93971 EXTREMITY STUDY: CPT

## 2020-11-11 ENCOUNTER — HOSPITAL ENCOUNTER (OUTPATIENT)
Dept: LAB | Age: 60
Discharge: HOME | End: 2020-11-11
Payer: COMMERCIAL

## 2020-11-11 LAB
ABSOLUTE EOS #: 0.09 K/UL (ref 0–0.44)
ABSOLUTE IMMATURE GRANULOCYTE: <0.03 K/UL (ref 0–0.3)
ABSOLUTE LYMPH #: 0.82 K/UL (ref 1.1–3.7)
ABSOLUTE MONO #: 0.49 K/UL (ref 0.1–1.2)
ALBUMIN SERPL-MCNC: 4.5 G/DL (ref 3.5–5.2)
ALBUMIN/GLOBULIN RATIO: 1.6 (ref 1–2.5)
ALP BLD-CCNC: 77 U/L (ref 40–129)
ALT SERPL-CCNC: 15 U/L (ref 5–41)
ANION GAP SERPL CALCULATED.3IONS-SCNC: 9 MMOL/L (ref 9–17)
AST SERPL-CCNC: 14 U/L
BASOPHILS # BLD: 1 % (ref 0–2)
BASOPHILS ABSOLUTE: 0.04 K/UL (ref 0–0.2)
BILIRUB SERPL-MCNC: 0.59 MG/DL (ref 0.3–1.2)
BUN BLDV-MCNC: 19 MG/DL (ref 8–23)
BUN/CREAT BLD: 17 (ref 9–20)
CALCIUM SERPL-MCNC: 9.9 MG/DL (ref 8.6–10.4)
CHLORIDE BLD-SCNC: 104 MMOL/L (ref 98–107)
CHOLESTEROL/HDL RATIO: 2.9
CHOLESTEROL: 179 MG/DL
CO2: 28 MMOL/L (ref 20–31)
CREAT SERPL-MCNC: 1.15 MG/DL (ref 0.7–1.2)
DIFFERENTIAL TYPE: ABNORMAL
EOSINOPHILS RELATIVE PERCENT: 2 % (ref 1–4)
ESTIMATED AVERAGE GLUCOSE: 120 MG/DL
GFR AFRICAN AMERICAN: >60 ML/MIN
GFR NON-AFRICAN AMERICAN: >60 ML/MIN
GFR SERPL CREATININE-BSD FRML MDRD: ABNORMAL ML/MIN/{1.73_M2}
GFR SERPL CREATININE-BSD FRML MDRD: ABNORMAL ML/MIN/{1.73_M2}
GLUCOSE BLD-MCNC: 116 MG/DL (ref 70–99)
HBA1C MFR BLD: 5.8 % (ref 4.8–5.9)
HCT VFR BLD CALC: 47.5 % (ref 40.7–50.3)
HDLC SERPL-MCNC: 61 MG/DL
HEMOGLOBIN: 15.3 G/DL (ref 13–17)
IMMATURE GRANULOCYTES: 0 %
LDL CHOLESTEROL: 105 MG/DL (ref 0–130)
LYMPHOCYTES # BLD: 14 % (ref 24–43)
MCH RBC QN AUTO: 29.4 PG (ref 25.2–33.5)
MCHC RBC AUTO-ENTMCNC: 32.2 G/DL (ref 25.2–33.5)
MCV RBC AUTO: 91.3 FL (ref 82.6–102.9)
MONOCYTES # BLD: 8 % (ref 3–12)
NRBC AUTOMATED: 0 PER 100 WBC
PDW BLD-RTO: 12.9 % (ref 11.8–14.4)
PLATELET # BLD: 314 K/UL (ref 138–453)
PLATELET ESTIMATE: ABNORMAL
PMV BLD AUTO: 9.5 FL (ref 8.1–13.5)
POTASSIUM SERPL-SCNC: 4.6 MMOL/L (ref 3.7–5.3)
PROSTATE SPECIFIC ANTIGEN: 8.62 UG/L
RBC # BLD: 5.2 M/UL (ref 4.21–5.77)
RBC # BLD: ABNORMAL 10*6/UL
SEG NEUTROPHILS: 75 % (ref 36–65)
SEGMENTED NEUTROPHILS ABSOLUTE COUNT: 4.57 K/UL (ref 1.5–8.1)
SODIUM BLD-SCNC: 141 MMOL/L (ref 135–144)
TOTAL PROTEIN: 7.4 G/DL (ref 6.4–8.3)
TRIGL SERPL-MCNC: 66 MG/DL
VLDLC SERPL CALC-MCNC: NORMAL MG/DL (ref 1–30)
WBC # BLD: 6 K/UL (ref 3.5–11.3)
WBC # BLD: ABNORMAL 10*3/UL

## 2020-11-11 PROCEDURE — 83036 HEMOGLOBIN GLYCOSYLATED A1C: CPT

## 2020-11-11 PROCEDURE — 85025 COMPLETE CBC W/AUTO DIFF WBC: CPT

## 2020-11-11 PROCEDURE — 80061 LIPID PANEL: CPT

## 2020-11-11 PROCEDURE — G0103 PSA SCREENING: HCPCS

## 2020-11-11 PROCEDURE — 36415 COLL VENOUS BLD VENIPUNCTURE: CPT

## 2020-11-11 PROCEDURE — 80053 COMPREHEN METABOLIC PANEL: CPT

## 2020-11-16 ENCOUNTER — OFFICE VISIT (OUTPATIENT)
Dept: INTERNAL MEDICINE | Age: 60
End: 2020-11-16
Payer: COMMERCIAL

## 2020-11-16 VITALS
RESPIRATION RATE: 16 BRPM | SYSTOLIC BLOOD PRESSURE: 128 MMHG | BODY MASS INDEX: 30.46 KG/M2 | DIASTOLIC BLOOD PRESSURE: 68 MMHG | HEART RATE: 78 BPM | WEIGHT: 201 LBS | HEIGHT: 68 IN

## 2020-11-16 PROCEDURE — 99214 OFFICE O/P EST MOD 30 MIN: CPT | Performed by: INTERNAL MEDICINE

## 2020-11-16 ASSESSMENT — PATIENT HEALTH QUESTIONNAIRE - PHQ9
SUM OF ALL RESPONSES TO PHQ QUESTIONS 1-9: 0
SUM OF ALL RESPONSES TO PHQ QUESTIONS 1-9: 0
2. FEELING DOWN, DEPRESSED OR HOPELESS: 0
SUM OF ALL RESPONSES TO PHQ QUESTIONS 1-9: 0
1. LITTLE INTEREST OR PLEASURE IN DOING THINGS: 0
SUM OF ALL RESPONSES TO PHQ9 QUESTIONS 1 & 2: 0

## 2020-11-16 NOTE — PROGRESS NOTES
[place] [address]  Dept: [phone]  Dept Fax: [phone]  Loc: [phone]     Visit Date:  11/16/2020  Patient:  John Levin  YOB: 1960  Provider: Jorge Moss MD    This note is in the APSO (Assessment/Plan/Subjective/Objective) format, to reduce scrolling and searching for information by the reviewer. Assessment & Plan      Elevated PSA: Asymptomatic. Will refer to urology. Reassess next day. Osteoarthritis of the left knee: Can proceed with surgery with no further testing. Patient is low risk. Will reassess afterwards. Venous insufficiency: Follows with vascular surgeon. Underwent varicose veins repair. We will continue to monitor. **Urology/TKA     Diagnosis Orders   1. Elevated PSA  Asia Hough MD, Urology, [place]   2. Impaired fasting glucose     3. Venous insufficiency (chronic) (peripheral)     4. Osteoarthritis of left knee, unspecified osteoarthritis type         Discussed use, benefit, and side effects of prescribed medications. All questions answered. Patient voiced understanding. Reviewed health maintenance. HPI:     He was seen in the internal medicine office today for   Chief Complaint   Patient presents with    Annual Exam    Pre-op Exam     TKA LEFT -  [facility]        Presents to follow-up on chronic medical conditions. Has a history of osteoarthritis of the left knee, for which he is scheduled to undergo surgery 2 weeks from now. Says that his knee feels generally well, however, it hurts when he is performing activity for a period of time such as biking or even at work. Surgery performed by Dr. Liliana De La Paz. Has a history of impaired fasting glucose which has been stable. Last blood work showed elevated PSA. Advised that I will refer to urology.     Has a history of venous insufficiency for which she follows vascular surgery. Underwent varicose vein repair in February. Subjective:      REVIEW OF SYSTEMS    Constitutional: Denies fever, chills  Eyes: Denies recent vision changes  Cardiovascular: Denies chest pain, LL edema  Respiratory: Denies cough, wheezes  Gastrointestinal: Denies abdominal pain, nausea, vomiting  Skin: Denies rash  Endocrine: Denies polyuria  Hematologic: Denies bruising  Genitourinary: Denies dysuria, hematuria  Neurological: Denies headache, numbness      Medications    Current Outpatient Medications:     naproxen sodium (ALEVE) 220 MG tablet, Take 220 mg by mouth 2 times daily as needed for Pain, Disp: , Rfl:     The patient has No Known Allergies. Past Medical History  Mauricio Barker  has a past medical history of Arthritis, Carpal tunnel syndrome, Dyslipidemia, History of vertigo, Impaired fasting glucose, and PONV (postoperative nausea and vomiting). Past Surgical History  The patient  has a past surgical history that includes Colonoscopy (08/13/60); other surgical history (06/23/78); Anterior cruciate ligament repair (Left, 02/99); vascular surgery (Left, 02/28/2020); and ABLATION SAPHENOUS VEIN W/ RFA (Left, 2/28/2020). Family History  This patient's family history includes Diabetes in his father; Heart Attack in his father; Prostate Cancer in his brother. Social History  Mauricio Barker  reports that he has never smoked. He has never used smokeless tobacco. He reports current alcohol use.     Health Maintenance:    Health Maintenance   Topic Date Due    DTaP/Tdap/Td vaccine (1 - Tdap) 08/13/1979    Shingles Vaccine (1 of 2) 08/13/2010    Flu vaccine (1) 09/01/2020    Colon cancer screen colonoscopy  11/05/2020    A1C test (Diabetic or Prediabetic)  11/11/2021    PSA counseling  11/11/2021    Lipid screen  11/11/2025    Hepatitis C screen  Completed    HIV screen  Completed    Hepatitis A vaccine  Aged Out    Hepatitis B vaccine  Aged Out    Hib vaccine  Aged C/ [place] 19 Meningococcal (ACWY) vaccine  Aged Out    Pneumococcal 0-64 years Vaccine  Aged Out           Objective:     PHYSICAL EXAM  /68 (Site: Left Upper Arm, Position: Sitting, Cuff Size: Medium Adult)   Pulse 78   Resp 16   Ht 5' 8\" (1.727 m)   Wt 201 lb (91.2 kg)   BMI 30.56 kg/m²   Constitutional: No acute distress. Sits in chair comfortably  Eyes: Sclerae nonicteric. No lid lag or proptosis  HENT: External ears normal. No external lesions on the nose  Neck: No gross masses. Trachea visibly midline  Respiratory: Good air entry bilaterally. No wheezing or crackles  Cardiovascular: Normal S1-S2. No murmurs. No lower extremity edema  Gastrointestinal: No visible masses. No visible hernias  Skin: No abnormal rashes. No abnormal masses  Neurologic: Cranial nerves grossly intact  Psychiatric: Normal affect. Alert and oriented        Labs Reviewed 11/16/2020:    Lab Results   Component Value Date    WBC 6.0 11/11/2020    HGB 15.3 11/11/2020    HCT 47.5 11/11/2020     11/11/2020    CHOL 179 11/11/2020    TRIG 66 11/11/2020    HDL 61 11/11/2020    ALT 15 11/11/2020    AST 14 11/11/2020     11/11/2020    K 4.6 11/11/2020     11/11/2020    CREATININE 1.15 11/11/2020    BUN 19 11/11/2020    CO2 28 11/11/2020    PSA 8.62 (H) 11/11/2020    GLUF 98 09/17/2018    LABA1C 5.8 11/11/2020            Electronically signed Iban PINO MD on 11/16/2020 at 8:43 AM      This note has been created using the Epic electronic health record, and dictated in part by [facility] dictation system. Despite the documenting physician's best efforts, there may be errors in spelling, grammar or syntax.

## 2021-01-04 ENCOUNTER — OFFICE VISIT (OUTPATIENT)
Dept: UROLOGY | Age: 61
End: 2021-01-04
Payer: COMMERCIAL

## 2021-01-04 VITALS
HEART RATE: 78 BPM | SYSTOLIC BLOOD PRESSURE: 120 MMHG | OXYGEN SATURATION: 97 % | HEIGHT: 68 IN | DIASTOLIC BLOOD PRESSURE: 80 MMHG | RESPIRATION RATE: 16 BRPM | WEIGHT: 203 LBS | BODY MASS INDEX: 30.77 KG/M2

## 2021-01-04 DIAGNOSIS — R97.20 ELEVATED PSA: Primary | ICD-10-CM

## 2021-01-04 PROCEDURE — 99204 OFFICE O/P NEW MOD 45 MIN: CPT | Performed by: UROLOGY

## 2021-01-04 SDOH — HEALTH STABILITY: MENTAL HEALTH: HOW MANY STANDARD DRINKS CONTAINING ALCOHOL DO YOU HAVE ON A TYPICAL DAY?: 1 OR 2

## 2021-01-04 NOTE — PROGRESS NOTES
MULGUETA Case MD        [address] [address]  Dept: [phone]  Dept Fax: [phone]  Loc: [phone]      [facility] Office Note - New Patient    Patient:  Phil Hardin  YOB: 1960  Date: 1/4/2021    The patient is a 61 y.o. male who presents today for evaluation of the following problems:   Chief Complaint   Patient presents with    New Patient    Elevated PSA    referred/consultation requested by DOROTA ADAME MD.    HISTORY OF PRESENT ILLNESS:     Elevated PSA  Onset was  Years ago  Overall, the problem(s) are worse. Severity is described as moderate to severe. Associated Symptoms: No dysuria, no gross hematuria. Current Pain Severity: 0    No family hx of prostate cancer  Has had elevated PSA in past but PSA normalized  No new UTI, no other personal  hx  Pt's brother sees me for elevated PSA not diagnosed with cancer      Requested/reviewed records from DOROTA ADAME MD office and/or outside [de-identified]    (Patient's old records have been requested, reviewed and pertinent findings summarized in today's note.)    Procedures Today: N/A      Last several PSA's:  Lab Results   Component Value Date    PSA 8.62 (H) 11/11/2020    PSA 3.35 09/17/2018    PSA 3.38 03/01/2018       Last total testosterone:  No results found for: TESTOSTERONE    Urinalysis today:  No results found for this visit on 01/04/21. Last BUN and creatinine:  Lab Results   Component Value Date    BUN 19 11/11/2020     Lab Results   Component Value Date    CREATININE 1.15 11/11/2020       Additional Lab/Culture results: none    Imaging Reviewed during this Office Visit:   Cj Byrd MD independently reviewed the images and verified the radiology reports from:    No results found.     PAST MEDICAL, FAMILY AND SOCIAL HISTORY:  Past Medical History:   Diagnosis Date    Arthritis Vitals:    01/04/21 1038   BP: 120/80   Pulse: 78   Resp: 16   SpO2: 97%     Body mass index is 30.87 kg/m². Constitutional: Patient in no acute distress; Neuro: alert and oriented to person place and time. Psych: Mood and affect normal.  Skin: warm, dry  Lungs: Respiratory effort normal, Symmetric chest rise  Cardiovascular:  Normal peripheral pulses; Regular rate, radial pulses palpable  Abdomen: Soft, non-tender, non-distended with no CVA, flank pain, hepatosplenomegaly or hernia. Kidneys normal.  Bladder non-tender and not distended. Lymphatics: no palpable lymphadenopathy  Minimal/no edema in bilateral lower extremities        Assessment and Plan        1. Elevated PSA               Plan:      Recheck PSA  PSA in the next week  Follow up in a few weeks with PSA result. May need biopsy        Prescriptions Ordered:  No orders of the defined types were placed in this encounter. Orders Placed:  No orders of the defined types were placed in this encounter.            Jeferson Farfan MD

## 2021-01-12 DIAGNOSIS — R97.20 ELEVATED PSA: Primary | ICD-10-CM

## 2021-01-18 ENCOUNTER — HOSPITAL ENCOUNTER (OUTPATIENT)
Dept: LAB | Age: 61
Discharge: HOME | End: 2021-01-18
Payer: COMMERCIAL

## 2021-01-18 DIAGNOSIS — R97.20 ELEVATED PSA: ICD-10-CM

## 2021-01-18 LAB — PROSTATE SPECIFIC ANTIGEN: 4.64 UG/L

## 2021-01-18 PROCEDURE — 36415 COLL VENOUS BLD VENIPUNCTURE: CPT

## 2021-01-18 PROCEDURE — 84153 ASSAY OF PSA TOTAL: CPT

## 2021-01-25 ENCOUNTER — OFFICE VISIT (OUTPATIENT)
Dept: UROLOGY | Age: 61
End: 2021-01-25
Payer: COMMERCIAL

## 2021-01-25 VITALS
SYSTOLIC BLOOD PRESSURE: 128 MMHG | RESPIRATION RATE: 16 BRPM | DIASTOLIC BLOOD PRESSURE: 86 MMHG | OXYGEN SATURATION: 97 % | HEART RATE: 82 BPM | HEIGHT: 68 IN | BODY MASS INDEX: 29.7 KG/M2 | WEIGHT: 196 LBS

## 2021-01-25 DIAGNOSIS — R97.20 ELEVATED PSA: Primary | ICD-10-CM

## 2021-01-25 DIAGNOSIS — N40.1 BENIGN LOCALIZED PROSTATIC HYPERPLASIA WITH LOWER URINARY TRACT SYMPTOMS (LUTS): ICD-10-CM

## 2021-01-25 PROCEDURE — 99213 OFFICE O/P EST LOW 20 MIN: CPT | Performed by: UROLOGY

## 2021-01-25 NOTE — PROGRESS NOTES
MULUGETA Fenton MD        [address] [address]  Dept: [phone]  Dept Fax: [phone]  Loc: [phone]      [facility] Urology Office Note - FU Patient    Patient:  Lynne Thompson  YOB: 1960  Date: 1/25/2021    The patient is a 61 y.o. male who presents today for evaluation of the following problems:   Chief Complaint   Patient presents with    Elevated PSA     2 wk f/u    [facility]  MALACHI PINO MD.    HISTORY OF PRESENT ILLNESS:     Elevated PSA  Onset was  Years ago  Overall, the problem(s) are better  Severity is described as moderate to severe. Associated Symptoms: No dysuria, no gross hematuria. Current Pain Severity: 0    No family hx of prostate cancer  Has had elevated PSA in past but PSA normalized. It has again dropped. No new UTI, no other personal  hx  Pt's brother sees me for elevated PSA not diagnosed with cancer      Requested/reviewed records from DOROTA ADAME MD office and/or outside physician/EMR    (Patient's old records have been requested, reviewed and pertinent findings summarized in today's note.)    Procedures Today: N/A      Last several PSA's:  Lab Results   Component Value Date    PSA 4.64 (H) 01/18/2021    PSA 8.62 (H) 11/11/2020    PSA 3.35 09/17/2018       Last total testosterone:  No results found for: TESTOSTERONE    Urinalysis today:  No results found for this visit on 01/25/21. Last BUN and creatinine:  Lab Results   Component Value Date    BUN 19 11/11/2020     Lab Results   Component Value Date    CREATININE 1.15 11/11/2020       Additional Lab/Culture results: none    Imaging Reviewed during this Office Visit:   Aiden Hugo MD independently reviewed the images and verified the radiology reports from:    No results found.     PAST MEDICAL, FAMILY AND SOCIAL HISTORY:  Past Medical History:   Diagnosis Date    male  Vitals:    01/25/21 1058   BP: 128/86   Pulse: 82   Resp: 16   SpO2: 97%     Body mass index is 29.8 kg/m². Constitutional: Patient in no acute distress;         Assessment and Plan        1. Elevated PSA    2. Benign localized prostatic hyperplasia with lower urinary tract symptoms (LUTS)               Plan:      psa has dropped- discussed MRI vs biopsy vs observation  Follow up in six months with PSA and will reassess per patient preference. Will consider biopsy vs MRI in past.       Prescriptions Ordered:  No orders of the defined types were placed in this encounter.      Orders Placed:  Orders Placed This Encounter   Procedures    PSA, Diagnostic     Standing Status:   Future     Standing Expiration Date:   1/25/2022            Dory Austin MD

## 2021-07-16 ENCOUNTER — HOSPITAL ENCOUNTER (OUTPATIENT)
Dept: LAB | Age: 61
Discharge: HOME | End: 2021-07-16
Payer: COMMERCIAL

## 2021-07-16 DIAGNOSIS — R97.20 ELEVATED PSA: ICD-10-CM

## 2021-07-16 LAB — PROSTATE SPECIFIC ANTIGEN: 4.51 UG/L

## 2021-07-16 PROCEDURE — 84153 ASSAY OF PSA TOTAL: CPT

## 2021-07-16 PROCEDURE — 36415 COLL VENOUS BLD VENIPUNCTURE: CPT

## 2021-07-26 ENCOUNTER — OFFICE VISIT (OUTPATIENT)
Dept: UROLOGY | Age: 61
End: 2021-07-26
Payer: COMMERCIAL

## 2021-07-26 VITALS
DIASTOLIC BLOOD PRESSURE: 72 MMHG | HEART RATE: 87 BPM | OXYGEN SATURATION: 97 % | HEIGHT: 68 IN | BODY MASS INDEX: 29.8 KG/M2 | SYSTOLIC BLOOD PRESSURE: 118 MMHG

## 2021-07-26 DIAGNOSIS — R97.20 ELEVATED PSA: Primary | ICD-10-CM

## 2021-07-26 DIAGNOSIS — N40.1 BENIGN LOCALIZED PROSTATIC HYPERPLASIA WITH LOWER URINARY TRACT SYMPTOMS (LUTS): ICD-10-CM

## 2021-07-26 PROCEDURE — 99214 OFFICE O/P EST MOD 30 MIN: CPT | Performed by: UROLOGY

## 2021-07-26 RX ORDER — TAMSULOSIN HYDROCHLORIDE 0.4 MG/1
0.4 CAPSULE ORAL DAILY
Qty: 30 CAPSULE | Refills: 11 | Status: SHIPPED | OUTPATIENT
Start: 2021-07-26

## 2021-07-26 NOTE — PROGRESS NOTES
MULUGETA De Oliveira MD        [address] [address]  Dept: [phone]  Dept Fax: [phone]  Loc: [phone]      [facility] Office Note - FU Patient    Patient:  Josefina Luna  YOB: 1960  Date: 7/26/2021    The patient is a 61 y.o. male who presents today for evaluation of the following problems:   Chief Complaint   Patient presents with    Elevated PSA     6 month f/u    referred/consultation requested by DOROTA ADAME MD.    HISTORY OF PRESENT ILLNESS:     Elevated PSA  No family hx of prostate cancer  Was previously 6. No new UTI, no other personal  hx  Pt's brother sees me for elevated PSA not diagnosed with cancer      Requested/reviewed records from DOROTA ADAME MD office and/or outside physician/EMR    (Patient's old records have been requested, reviewed and pertinent findings summarized in today's note.)    Procedures Today: N/A      Last several PSA's:  Lab Results   Component Value Date    PSA 4.51 (H) 07/16/2021    PSA 4.64 (H) 01/18/2021    PSA 8.62 (H) 11/11/2020       Last total testosterone:  No results found for: TESTOSTERONE    Urinalysis today:  No results found for this visit on 07/26/21. Last BUN and creatinine:  Lab Results   Component Value Date    BUN 19 11/11/2020     Lab Results   Component Value Date    CREATININE 1.15 11/11/2020       Additional Lab/Culture results: none    Imaging Reviewed during this Office Visit:   Kingston Hopkins MD independently reviewed the images and verified the radiology reports from:    No results found. PAST MEDICAL, FAMILY AND SOCIAL HISTORY:  Past Medical History:   Diagnosis Date    Arthritis     Carpal tunnel syndrome     EMG 11/07, bilaterally, right greater than left, as well as left ulnar neuropathy.  Dyslipidemia     Mild.  History of vertigo     2 years ago (Written 01/31/2020).     Impaired fasting glucose     Borderline diabetes. Diet controlled per pt.  PONV (postoperative nausea and vomiting)      Past Surgical History:   Procedure Laterality Date    ABLATION SAPHENOUS VEIN W/ RFA Left 2/28/2020    LEFT SAPHENOUS VEIN ABLATION  WITH PHLEBECTOMIES  RADIOFREQUENCY ENDOSCOPIC-  VAS TECH performed by Arias Brock MD at [address] Left 02/99    COLONOSCOPY  08/13/60    OTHER SURGICAL HISTORY  06/23/78    Verrucae vulgaris, palm of right hand.  VASCULAR SURGERY Left 02/28/2020    Left Saphenous Vein Ablation With Phlebectomies Radiofrequency Endoscopic- Vas Tech     Family History   Problem Relation Age of Onset    Heart Attack Father     Diabetes Father     Prostate Cancer Brother     Colon Cancer Neg Hx      No outpatient medications have been marked as taking for the 7/26/21 encounter (Office Visit) with Marshall Vargas MD.       Patient has no known allergies. Social History     Tobacco Use   Smoking Status Never Smoker   Smokeless Tobacco Never Used      (If patient a smoker, smoking cessation counseling offered)   Social History     Substance and Sexual Activity   Alcohol Use Yes    Comment: social       REVIEW OF SYSTEMS:  Constitutional: negative  Eyes: negative  Respiratory: negative  Cardiovascular: negative  Gastrointestinal: negative  Genitourinary: see HPI  Musculoskeletal: negative  Skin: negative   Neurological: negative  Hematological/Lymphatic: negative  Psychological: negative          Physical Exam:    This a 61 y.o. male  Vitals:    07/26/21 0818   BP: 118/72   Pulse: 87   SpO2: 97%     Body mass index is 29.8 kg/m². Constitutional: Patient in no acute distress;         Assessment and Plan        1. Elevated PSA    2. Benign localized prostatic hyperplasia with lower urinary tract symptoms (LUTS)               Plan:      BPH- symptoms worsening. Nocturia x 1. Will start flomax.   Elevated psa-  psa still elevated- discussed MRI vs biopsy vs observation. DESEAN by Dr. Agatha Irvin was negative. Will order prostate MRI. If too expensive, will repeat PSA in six months. Follow up with results          Prescriptions Ordered:  No orders of the defined types were placed in this encounter. Orders Placed:  No orders of the defined types were placed in this encounter.            Radha Penny MD

## 2021-11-23 ENCOUNTER — TELEPHONE (OUTPATIENT)
Dept: UROLOGY | Age: 61
End: 2021-11-23

## 2021-11-23 NOTE — TELEPHONE ENCOUNTER
Patient had an appointment on 7/26/2021. Dr. Shirley Amador wanted him to have an MRI on his prostate but patient wasn't sure if that would be covered by insurance. Patient states someone was suppose to call him to let him know but never received a call, please call him back at [phone].

## 2022-12-09 ENCOUNTER — TELEPHONE (OUTPATIENT)
Dept: INTERNAL MEDICINE | Age: 62
End: 2022-12-09

## 2022-12-09 DIAGNOSIS — E78.5 DYSLIPIDEMIA: Primary | ICD-10-CM

## 2022-12-09 DIAGNOSIS — R73.01 IMPAIRED FASTING GLUCOSE: ICD-10-CM

## 2022-12-09 DIAGNOSIS — R97.20 ELEVATED PSA: ICD-10-CM

## 2022-12-09 NOTE — TELEPHONE ENCOUNTER
----- Message from [address] sent at 12/9/2022  2:20 PM EST -----  Subject: Referral Request    Reason for referral request? Request for blood work and PSA  Provider patient wants to be referred to(if known):     Provider Phone Number(if known): Additional Information for Provider?  Appt 1/3.  ---------------------------------------------------------------------------  --------------  Baron BERGER    [phone]; OK to leave message on voicemail  ---------------------------------------------------------------------------  --------------

## 2022-12-16 ENCOUNTER — HOSPITAL ENCOUNTER (OUTPATIENT)
Age: 62
Discharge: HOME | End: 2022-12-16
Payer: COMMERCIAL

## 2022-12-16 DIAGNOSIS — E78.5 DYSLIPIDEMIA: ICD-10-CM

## 2022-12-16 DIAGNOSIS — R97.20 ELEVATED PSA: ICD-10-CM

## 2022-12-16 DIAGNOSIS — R73.01 IMPAIRED FASTING GLUCOSE: ICD-10-CM

## 2022-12-16 LAB
ABSOLUTE EOS #: 0.12 K/UL (ref 0–0.44)
ABSOLUTE IMMATURE GRANULOCYTE: <0.03 K/UL (ref 0–0.3)
ABSOLUTE LYMPH #: 0.88 K/UL (ref 1.1–3.7)
ABSOLUTE MONO #: 0.42 K/UL (ref 0.1–1.2)
ALBUMIN SERPL-MCNC: 4.3 G/DL (ref 3.5–5.2)
ALBUMIN/GLOBULIN RATIO: 1.5 (ref 1–2.5)
ALP BLD-CCNC: 77 U/L (ref 40–129)
ALT SERPL-CCNC: 17 U/L (ref 5–41)
ANION GAP SERPL CALCULATED.3IONS-SCNC: 10 MMOL/L (ref 9–17)
AST SERPL-CCNC: 16 U/L
BASOPHILS # BLD: 1 % (ref 0–2)
BASOPHILS ABSOLUTE: 0.05 K/UL (ref 0–0.2)
BILIRUB SERPL-MCNC: 0.6 MG/DL (ref 0.3–1.2)
BUN BLDV-MCNC: 18 MG/DL (ref 8–23)
BUN/CREAT BLD: 20 (ref 9–20)
CALCIUM SERPL-MCNC: 9.6 MG/DL (ref 8.6–10.4)
CHLORIDE BLD-SCNC: 105 MMOL/L (ref 98–107)
CHOLESTEROL/HDL RATIO: 2.8
CHOLESTEROL: 167 MG/DL
CO2: 28 MMOL/L (ref 20–31)
CREAT SERPL-MCNC: 0.91 MG/DL (ref 0.7–1.2)
EOSINOPHILS RELATIVE PERCENT: 2 % (ref 1–4)
ESTIMATED AVERAGE GLUCOSE: 117 MG/DL
GFR SERPL CREATININE-BSD FRML MDRD: >60 ML/MIN/1.73M2
GLUCOSE BLD-MCNC: 103 MG/DL (ref 70–99)
HBA1C MFR BLD: 5.7 % (ref 4–6)
HCT VFR BLD CALC: 47 % (ref 40.7–50.3)
HDLC SERPL-MCNC: 59 MG/DL
HEMOGLOBIN: 15.7 G/DL (ref 13–17)
IMMATURE GRANULOCYTES: 0 %
LDL CHOLESTEROL: 98 MG/DL (ref 0–130)
LYMPHOCYTES # BLD: 18 % (ref 24–43)
MCH RBC QN AUTO: 29.2 PG (ref 25.2–33.5)
MCHC RBC AUTO-ENTMCNC: 33.4 G/DL (ref 25.2–33.5)
MCV RBC AUTO: 87.4 FL (ref 82.6–102.9)
MONOCYTES # BLD: 9 % (ref 3–12)
NRBC AUTOMATED: 0 PER 100 WBC
PDW BLD-RTO: 12.5 % (ref 11.8–14.4)
PLATELET # BLD: 289 K/UL (ref 138–453)
PMV BLD AUTO: 9.8 FL (ref 8.1–13.5)
POTASSIUM SERPL-SCNC: 4.3 MMOL/L (ref 3.7–5.3)
PROSTATE SPECIFIC ANTIGEN: 4.61 NG/ML
RBC # BLD: 5.38 M/UL (ref 4.21–5.77)
SEG NEUTROPHILS: 70 % (ref 36–65)
SEGMENTED NEUTROPHILS ABSOLUTE COUNT: 3.46 K/UL (ref 1.5–8.1)
SODIUM BLD-SCNC: 143 MMOL/L (ref 135–144)
TOTAL PROTEIN: 7.2 G/DL (ref 6.4–8.3)
TRIGL SERPL-MCNC: 51 MG/DL
WBC # BLD: 5 K/UL (ref 3.5–11.3)

## 2022-12-16 PROCEDURE — 80053 COMPREHEN METABOLIC PANEL: CPT

## 2022-12-16 PROCEDURE — 36415 COLL VENOUS BLD VENIPUNCTURE: CPT

## 2022-12-16 PROCEDURE — 85025 COMPLETE CBC W/AUTO DIFF WBC: CPT

## 2022-12-16 PROCEDURE — 83036 HEMOGLOBIN GLYCOSYLATED A1C: CPT

## 2022-12-16 PROCEDURE — 80061 LIPID PANEL: CPT

## 2022-12-16 PROCEDURE — 84153 ASSAY OF PSA TOTAL: CPT

## 2023-01-03 ENCOUNTER — OFFICE VISIT (OUTPATIENT)
Dept: INTERNAL MEDICINE | Age: 63
End: 2023-01-03
Payer: COMMERCIAL

## 2023-01-03 VITALS
SYSTOLIC BLOOD PRESSURE: 130 MMHG | RESPIRATION RATE: 16 BRPM | DIASTOLIC BLOOD PRESSURE: 78 MMHG | BODY MASS INDEX: 31.07 KG/M2 | OXYGEN SATURATION: 95 % | HEART RATE: 68 BPM | WEIGHT: 205 LBS | HEIGHT: 68 IN

## 2023-01-03 DIAGNOSIS — E78.5 DYSLIPIDEMIA: ICD-10-CM

## 2023-01-03 DIAGNOSIS — Z00.00 ENCOUNTER FOR WELL ADULT EXAM WITHOUT ABNORMAL FINDINGS: ICD-10-CM

## 2023-01-03 DIAGNOSIS — Z12.11 SCREENING FOR COLON CANCER: Primary | ICD-10-CM

## 2023-01-03 DIAGNOSIS — R97.20 ELEVATED PSA: ICD-10-CM

## 2023-01-03 DIAGNOSIS — R73.01 IMPAIRED FASTING GLUCOSE: ICD-10-CM

## 2023-01-03 PROCEDURE — 99396 PREV VISIT EST AGE 40-64: CPT | Performed by: INTERNAL MEDICINE

## 2023-01-03 PROCEDURE — 90715 TDAP VACCINE 7 YRS/> IM: CPT | Performed by: INTERNAL MEDICINE

## 2023-01-03 PROCEDURE — 90686 IIV4 VACC NO PRSV 0.5 ML IM: CPT | Performed by: INTERNAL MEDICINE

## 2023-01-03 PROCEDURE — 90472 IMMUNIZATION ADMIN EACH ADD: CPT | Performed by: INTERNAL MEDICINE

## 2023-01-03 PROCEDURE — 90471 IMMUNIZATION ADMIN: CPT | Performed by: INTERNAL MEDICINE

## 2023-01-03 ASSESSMENT — PATIENT HEALTH QUESTIONNAIRE - PHQ9
DEPRESSION UNABLE TO ASSESS: FUNCTIONAL CAPACITY MOTIVATION LIMITS ACCURACY
1. LITTLE INTEREST OR PLEASURE IN DOING THINGS: 0
SUM OF ALL RESPONSES TO PHQ9 QUESTIONS 1 & 2: 0
SUM OF ALL RESPONSES TO PHQ QUESTIONS 1-9: 0
SUM OF ALL RESPONSES TO PHQ QUESTIONS 1-9: 0
2. FEELING DOWN, DEPRESSED OR HOPELESS: 0
SUM OF ALL RESPONSES TO PHQ QUESTIONS 1-9: 0
SUM OF ALL RESPONSES TO PHQ QUESTIONS 1-9: 0

## 2023-01-03 NOTE — PROGRESS NOTES
Well Adult Note  Name: Oscar Mclaughlin Date: 1/3/2023   MRN: [id number] Sex: Male   Age: 58 y.o. Ethnicity: Non- / Non    : 1960 Race: White (non-)      Kathrine Edmond is here for well adult exam.  History:  Has a history of BPH, and was previously prescribed Flomax, says that he has not used it at all. Says that he often wakes up once at night to urinate. However, denies fever, chills, dysuria, hematuria. Moreover, he had elevated PSA for which he was seen by urology previously. He was offered an MRI by the urologist, but opted not to do it at the time,, and says that he is not interested in doing it now. His PSA appears to be relatively stable at this time at 4.6. He prefers not to follow-up with urology if it is not necessary. Last blood work shows slightly elevated ASCVD score. He is not on cholesterol medication. He says that he has a family history of hyperlipidemia as well as CAD. Says that that he is trying to cut back, and it seems that his cholesterol slightly better than before. Says that he would prefer not to start cholesterol medication at this time. Review of Systems    No Known Allergies      Prior to Visit Medications    Medication Sig Taking? Authorizing Provider   tamsulosin (FLOMAX) 0.4 MG capsule Take 1 capsule by mouth daily  Patient not taking: Reported on 1/3/2023  Alana Anthony MD   Acetaminophen (TYLENOL PO) Take 2 tablets by mouth every 6 hours as needed (pain)  Patient not taking: Reported on 1/3/2023  Historical Provider, MD         Past Medical History:   Diagnosis Date    Arthritis     Carpal tunnel syndrome     EMG , bilaterally, right greater than left, as well as left ulnar neuropathy. Dyslipidemia     Mild. History of vertigo     2 years ago (Written 2020). Impaired fasting glucose     Borderline diabetes. Diet controlled per pt.      PONV (postoperative nausea and vomiting)        Past Surgical History:   Procedure Laterality Date    ABLATION SAPHENOUS VEIN W/ RFA Left 2/28/2020    LEFT SAPHENOUS VEIN ABLATION  WITH PHLEBECTOMIES  RADIOFREQUENCY ENDOSCOPIC-  VAS TECH performed by Jim Purcell MD at [address] Left 02/99    COLONOSCOPY  08/13/60    OTHER SURGICAL HISTORY  06/23/78    Verrucae vulgaris, palm of right hand. VASCULAR SURGERY Left 02/28/2020    Left Saphenous Vein Ablation With Phlebectomies Radiofrequency Endoscopic- [de-identified] Tech         Family History   Problem Relation Age of Onset    Heart Attack Father     Diabetes Father     Prostate Cancer Brother     Colon Cancer Neg Hx        Social History     Tobacco Use    Smoking status: Never    Smokeless tobacco: Never   Vaping Use    Vaping Use: Never used   Substance Use Topics    Alcohol use: Yes     Comment: social    Drug use: Never       Objective   /78 (Site: Left Upper Arm, Position: Sitting, Cuff Size: Medium Adult)   Pulse 68   Resp 16   Ht 5' 8\" (1.727 m)   Wt 205 lb (93 kg)   SpO2 95%   BMI 31.17 kg/m²   Wt Readings from Last 3 Encounters:   01/03/23 205 lb (93 kg)   01/25/21 196 lb (88.9 kg)   01/04/21 203 lb (92.1 kg)     There were no vitals filed for this visit. Physical Exam      Assessment   Plan   1. Screening for colon cancer  -     [facility] General Surgery, [place]  2. Dyslipidemia  -     CBC with Auto Differential; Future  -     Comprehensive Metabolic Panel; Future  -     Lipid Panel; Future  3. Impaired fasting glucose  -     Hemoglobin A1C; Future  4. Elevated PSA  -     PSA, Diagnostic; Future  5.  Encounter for well adult exam without abnormal findings         Personalized Preventive Plan   Current Health Maintenance Status  Immunization History   Administered Date(s) Administered    COVID-19, PFIZER PURPLE top, DILUTE for use, (age 15 y+), 30mcg/0.3mL 03/04/2021, 03/25/2021, 11/30/2021    Influenza, AFLURIA (age 1 yrs+), FLUZONE, (age 10 mo+), SARAH, 0.5mL 12/10/2016    Influenza, FLUARIX, FLULAVAL, FLUZONE (age 10 mo+) AND AFLURIA, (age 1 y+), PF, 0.5mL 01/03/2023    Td, unspecified formulation 06/01/2000, 11/01/2007    Tdap (Boostrix, Adacel) 01/03/2023        Health Maintenance   Topic Date Due    Depression Screen  Never done    Shingles vaccine (1 of 2) Never done    Colorectal Cancer Screen  11/05/2020    COVID-19 Vaccine (4 - Booster for Pfizer series) 01/25/2022    A1C test (Diabetic or Prediabetic)  12/16/2023    Prostate Specific Antigen (PSA) Screening or Monitoring  12/16/2023    Lipids  12/16/2027    DTaP/Tdap/Td vaccine (2 - Td or Tdap) 01/03/2033    Flu vaccine  Completed    Hepatitis C screen  Completed    HIV screen  Completed    Hepatitis A vaccine  Aged Out    Hib vaccine  Aged Out    Meningococcal (ACWY) vaccine  Aged Out    Pneumococcal 0-64 years Vaccine  Aged Out     Recommendations for [employer] Due: see orders and patient instructions/AVS.    No follow-ups on file.

## 2023-01-04 ENCOUNTER — TELEPHONE (OUTPATIENT)
Dept: SURGERY | Age: 63
End: 2023-01-04

## 2024-01-02 ENCOUNTER — HOSPITAL ENCOUNTER (OUTPATIENT)
Age: 64
Discharge: HOME | End: 2024-01-02
Payer: COMMERCIAL

## 2024-01-02 DIAGNOSIS — R97.20 ELEVATED PSA: ICD-10-CM

## 2024-01-02 DIAGNOSIS — E78.5 DYSLIPIDEMIA: ICD-10-CM

## 2024-01-02 DIAGNOSIS — R73.01 IMPAIRED FASTING GLUCOSE: ICD-10-CM

## 2024-01-02 LAB
ALBUMIN SERPL-MCNC: 4.2 G/DL (ref 3.5–5.2)
ALBUMIN/GLOB SERPL: 1.6 {RATIO} (ref 1–2.5)
ALP SERPL-CCNC: 73 U/L (ref 40–129)
ALT SERPL-CCNC: 25 U/L (ref 5–41)
ANION GAP SERPL CALCULATED.3IONS-SCNC: 12 MMOL/L (ref 9–17)
AST SERPL-CCNC: 18 U/L
BASOPHILS # BLD: 0.03 K/UL (ref 0–0.2)
BASOPHILS NFR BLD: 1 % (ref 0–2)
BILIRUB SERPL-MCNC: 0.6 MG/DL (ref 0.3–1.2)
BUN SERPL-MCNC: 12 MG/DL (ref 8–23)
BUN/CREAT SERPL: 13 (ref 9–20)
CALCIUM SERPL-MCNC: 9.2 MG/DL (ref 8.6–10.4)
CHLORIDE SERPL-SCNC: 104 MMOL/L (ref 98–107)
CHOLEST SERPL-MCNC: 188 MG/DL
CHOLESTEROL/HDL RATIO: 3.5
CO2 SERPL-SCNC: 27 MMOL/L (ref 20–31)
CREAT SERPL-MCNC: 0.9 MG/DL (ref 0.7–1.2)
EOSINOPHIL # BLD: 0.15 K/UL (ref 0–0.44)
EOSINOPHILS RELATIVE PERCENT: 2 % (ref 1–4)
ERYTHROCYTE [DISTWIDTH] IN BLOOD BY AUTOMATED COUNT: 12.8 % (ref 11.8–14.4)
EST. AVERAGE GLUCOSE BLD GHB EST-MCNC: 117 MG/DL
GFR SERPL CREATININE-BSD FRML MDRD: >60 ML/MIN/1.73M2
GLUCOSE SERPL-MCNC: 111 MG/DL (ref 70–99)
HBA1C MFR BLD: 5.7 % (ref 4–6)
HCT VFR BLD AUTO: 47 % (ref 40.7–50.3)
HDLC SERPL-MCNC: 54 MG/DL
HGB BLD-MCNC: 15.6 G/DL (ref 13–17)
IMM GRANULOCYTES # BLD AUTO: <0.03 K/UL (ref 0–0.3)
IMM GRANULOCYTES NFR BLD: 0 %
LDLC SERPL CALC-MCNC: 114 MG/DL (ref 0–130)
LYMPHOCYTES NFR BLD: 0.85 K/UL (ref 1.1–3.7)
LYMPHOCYTES RELATIVE PERCENT: 14 % (ref 24–43)
MCH RBC QN AUTO: 29.1 PG (ref 25.2–33.5)
MCHC RBC AUTO-ENTMCNC: 33.2 G/DL (ref 25.2–33.5)
MCV RBC AUTO: 87.5 FL (ref 82.6–102.9)
MONOCYTES NFR BLD: 0.51 K/UL (ref 0.1–1.2)
MONOCYTES NFR BLD: 8 % (ref 3–12)
NEUTROPHILS NFR BLD: 75 % (ref 36–65)
NEUTS SEG NFR BLD: 4.7 K/UL (ref 1.5–8.1)
NRBC BLD-RTO: 0 PER 100 WBC
PLATELET # BLD AUTO: 279 K/UL (ref 138–453)
PMV BLD AUTO: 9.7 FL (ref 8.1–13.5)
POTASSIUM SERPL-SCNC: 4.2 MMOL/L (ref 3.7–5.3)
PROT SERPL-MCNC: 6.8 G/DL (ref 6.4–8.3)
PSA SERPL-MCNC: 6.04 NG/ML
RBC # BLD AUTO: 5.37 M/UL (ref 4.21–5.77)
SODIUM SERPL-SCNC: 143 MMOL/L (ref 135–144)
TRIGL SERPL-MCNC: 99 MG/DL
WBC OTHER # BLD: 6.3 K/UL (ref 3.5–11.3)

## 2024-01-02 PROCEDURE — 80053 COMPREHEN METABOLIC PANEL: CPT

## 2024-01-02 PROCEDURE — 85025 COMPLETE CBC W/AUTO DIFF WBC: CPT

## 2024-01-02 PROCEDURE — 83036 HEMOGLOBIN GLYCOSYLATED A1C: CPT

## 2024-01-02 PROCEDURE — 84153 ASSAY OF PSA TOTAL: CPT

## 2024-01-02 PROCEDURE — 80061 LIPID PANEL: CPT

## 2024-01-02 PROCEDURE — 36415 COLL VENOUS BLD VENIPUNCTURE: CPT

## 2024-01-04 ENCOUNTER — OFFICE VISIT (OUTPATIENT)
Dept: INTERNAL MEDICINE | Age: 64
End: 2024-01-04

## 2024-01-04 VITALS
HEIGHT: 68 IN | HEART RATE: 74 BPM | WEIGHT: 211 LBS | BODY MASS INDEX: 31.98 KG/M2 | RESPIRATION RATE: 16 BRPM | SYSTOLIC BLOOD PRESSURE: 124 MMHG | DIASTOLIC BLOOD PRESSURE: 72 MMHG | OXYGEN SATURATION: 96 %

## 2024-01-04 DIAGNOSIS — E78.5 DYSLIPIDEMIA: ICD-10-CM

## 2024-01-04 DIAGNOSIS — Z00.00 ENCOUNTER FOR WELL ADULT EXAM WITHOUT ABNORMAL FINDINGS: ICD-10-CM

## 2024-01-04 DIAGNOSIS — R42 LIGHTHEADEDNESS: ICD-10-CM

## 2024-01-04 DIAGNOSIS — R97.20 ELEVATED PSA: ICD-10-CM

## 2024-01-04 DIAGNOSIS — Z12.11 SCREENING FOR COLON CANCER: Primary | ICD-10-CM

## 2024-01-04 SDOH — ECONOMIC STABILITY: FOOD INSECURITY: WITHIN THE PAST 12 MONTHS, THE FOOD YOU BOUGHT JUST DIDN'T LAST AND YOU DIDN'T HAVE MONEY TO GET MORE.: NEVER TRUE

## 2024-01-04 SDOH — ECONOMIC STABILITY: INCOME INSECURITY: HOW HARD IS IT FOR YOU TO PAY FOR THE VERY BASICS LIKE FOOD, HOUSING, MEDICAL CARE, AND HEATING?: NOT HARD AT ALL

## 2024-01-04 SDOH — ECONOMIC STABILITY: HOUSING INSECURITY
IN THE LAST 12 MONTHS, WAS THERE A TIME WHEN YOU DID NOT HAVE A STEADY PLACE TO SLEEP OR SLEPT IN A SHELTER (INCLUDING NOW)?: NO

## 2024-01-04 SDOH — ECONOMIC STABILITY: FOOD INSECURITY: WITHIN THE PAST 12 MONTHS, YOU WORRIED THAT YOUR FOOD WOULD RUN OUT BEFORE YOU GOT MONEY TO BUY MORE.: NEVER TRUE

## 2024-01-04 ASSESSMENT — PATIENT HEALTH QUESTIONNAIRE - PHQ9
SUM OF ALL RESPONSES TO PHQ QUESTIONS 1-9: 0
1. LITTLE INTEREST OR PLEASURE IN DOING THINGS: 0
SUM OF ALL RESPONSES TO PHQ9 QUESTIONS 1 & 2: 0
SUM OF ALL RESPONSES TO PHQ QUESTIONS 1-9: 0
SUM OF ALL RESPONSES TO PHQ QUESTIONS 1-9: 0
2. FEELING DOWN, DEPRESSED OR HOPELESS: 0
SUM OF ALL RESPONSES TO PHQ QUESTIONS 1-9: 0
DEPRESSION UNABLE TO ASSESS: FUNCTIONAL CAPACITY MOTIVATION LIMITS ACCURACY

## 2024-01-05 ENCOUNTER — TELEPHONE (OUTPATIENT)
Dept: SURGERY | Age: 64
End: 2024-01-05

## 2024-01-14 NOTE — PROGRESS NOTES
Well Adult Note  Name: Marino Jesus Today’s Date: 2024   MRN: [id number] Sex: Male   Age: 63 y.o. Ethnicity: Non- / Non    : 1960 Race: White (non-)      Marino Jesus is here for well adult exam.  History:  Reports that he feels lightheaded when he stands up from a seated position, says that he feels like he is about to pass out when he stands up.  Denies fever, chills, chest pain, shortness breath, palpitations.  Says that he has been happening for the last several months.    Review of Systems    No Known Allergies      Prior to Visit Medications    Not on File         Past Medical History:   Diagnosis Date    Arthritis     Carpal tunnel syndrome     EMG , bilaterally, right greater than left, as well as left ulnar neuropathy.     Dyslipidemia     Mild.    History of vertigo     2 years ago (Written 2020).    Impaired fasting glucose     Borderline diabetes. Diet controlled per pt.     PONV (postoperative nausea and vomiting)        Past Surgical History:   Procedure Laterality Date    ABLATION SAPHENOUS VEIN W/ RFA Left 2020    LEFT SAPHENOUS VEIN ABLATION  WITH PHLEBECTOMIES  RADIOFREQUENCY ENDOSCOPIC-  VAS TECH performed by Arthur Delos Reyes, MD at [facility] OR    ANTERIOR CRUCIATE LIGAMENT REPAIR Left     COLONOSCOPY  60    OTHER SURGICAL HISTORY  78    Verrucae vulgaris, palm of right hand.     VASCULAR SURGERY Left 2020    Left Saphenous Vein Ablation With Phlebectomies Radiofrequency Endoscopic- Vas Tech         Family History   Problem Relation Age of Onset    Heart Attack Father     Diabetes Father     Prostate Cancer Brother     Colon Cancer Neg Hx        Social History     Tobacco Use    Smoking status: Never    Smokeless tobacco: Never   Vaping Use    Vaping Use: Never used   Substance Use Topics    Alcohol use: Yes     Comment: social    Drug use: Never       Objective     Vital Signs  /72 (Site: Left Upper Arm,

## 2024-01-26 RX ORDER — POLYETHYLENE GLYCOL 3350, SODIUM SULFATE ANHYDROUS, SODIUM BICARBONATE, SODIUM CHLORIDE, POTASSIUM CHLORIDE 236; 22.74; 6.74; 5.86; 2.97 G/4L; G/4L; G/4L; G/4L; G/4L
4 POWDER, FOR SOLUTION ORAL ONCE
Qty: 4000 ML | Refills: 0 | Status: SHIPPED | OUTPATIENT
Start: 2024-01-26 | End: 2024-01-26

## 2024-01-26 RX ORDER — BISACODYL 5 MG/1
TABLET, DELAYED RELEASE ORAL
Qty: 2 TABLET | Refills: 0 | Status: SHIPPED | OUTPATIENT
Start: 2024-01-26

## 2024-02-07 ENCOUNTER — HOSPITAL ENCOUNTER (OUTPATIENT)
Age: 64
Discharge: HOME | End: 2024-02-09
Attending: INTERNAL MEDICINE
Payer: COMMERCIAL

## 2024-02-07 VITALS
WEIGHT: 205 LBS | SYSTOLIC BLOOD PRESSURE: 148 MMHG | HEART RATE: 79 BPM | DIASTOLIC BLOOD PRESSURE: 74 MMHG | BODY MASS INDEX: 31.07 KG/M2 | HEIGHT: 68 IN

## 2024-02-07 DIAGNOSIS — R42 LIGHTHEADEDNESS: ICD-10-CM

## 2024-02-07 LAB
ECHO AO ASC DIAM: 3 CM
ECHO AO ASCENDING AORTA INDEX: 1.45 CM/M2
ECHO AO ROOT DIAM: 3.1 CM
ECHO AO ROOT INDEX: 1.5 CM/M2
ECHO AV AREA PEAK VELOCITY: 1.9 CM2
ECHO AV AREA/BSA PEAK VELOCITY: 0.9 CM2/M2
ECHO AV PEAK GRADIENT: 8 MMHG
ECHO AV PEAK VELOCITY: 1.4 M/S
ECHO AV VELOCITY RATIO: 0.5
ECHO BSA: 2.11 M2
ECHO EST RA PRESSURE: 3 MMHG
ECHO LA AREA 4C: 18.1 CM2
ECHO LA DIAMETER INDEX: 1.69 CM/M2
ECHO LA DIAMETER: 3.5 CM
ECHO LA MAJOR AXIS: 6.2 CM
ECHO LA TO AORTIC ROOT RATIO: 1.13
ECHO LA VOL MOD A4C: 43 ML (ref 18–58)
ECHO LA VOLUME INDEX MOD A4C: 21 ML/M2 (ref 16–34)
ECHO LV E' LATERAL VELOCITY: 8 CM/S
ECHO LV E' SEPTAL VELOCITY: 6 CM/S
ECHO LV EDV A2C: 47 ML
ECHO LV EDV A4C: 68 ML
ECHO LV EDV INDEX A4C: 33 ML/M2
ECHO LV EDV NDEX A2C: 23 ML/M2
ECHO LV EJECTION FRACTION A2C: 59 %
ECHO LV EJECTION FRACTION A4C: 60 %
ECHO LV EJECTION FRACTION BIPLANE: 58 % (ref 55–100)
ECHO LV ESV A2C: 19 ML
ECHO LV ESV A4C: 27 ML
ECHO LV ESV INDEX A2C: 9 ML/M2
ECHO LV ESV INDEX A4C: 13 ML/M2
ECHO LV FRACTIONAL SHORTENING: 24 % (ref 28–44)
ECHO LV INTERNAL DIMENSION DIASTOLE INDEX: 2.66 CM/M2
ECHO LV INTERNAL DIMENSION DIASTOLIC: 5.5 CM (ref 4.2–5.9)
ECHO LV INTERNAL DIMENSION SYSTOLIC INDEX: 2.03 CM/M2
ECHO LV INTERNAL DIMENSION SYSTOLIC: 4.2 CM
ECHO LV ISOVOLUMETRIC RELAXATION TIME (IVRT): 85 MS
ECHO LV IVSD: 1 CM (ref 0.6–1)
ECHO LV MASS 2D: 213.2 G (ref 88–224)
ECHO LV MASS INDEX 2D: 103 G/M2 (ref 49–115)
ECHO LV POSTERIOR WALL DIASTOLIC: 1 CM (ref 0.6–1)
ECHO LV RELATIVE WALL THICKNESS RATIO: 0.36
ECHO LVOT AREA: 3.8 CM2
ECHO LVOT DIAM: 2.2 CM
ECHO LVOT PEAK GRADIENT: 2 MMHG
ECHO LVOT PEAK VELOCITY: 0.7 M/S
ECHO MV A VELOCITY: 0.94 M/S
ECHO MV E DECELERATION TIME (DT): 180 MS
ECHO MV E VELOCITY: 0.72 M/S
ECHO MV E/A RATIO: 0.77
ECHO MV E/E' LATERAL: 9
ECHO MV E/E' RATIO (AVERAGED): 10.5
ECHO MV MAX VELOCITY: 1.1 M/S
ECHO MV PEAK GRADIENT: 5 MMHG
ECHO PV MAX VELOCITY: 1 M/S
ECHO PV PEAK GRADIENT: 4 MMHG
ECHO RIGHT VENTRICULAR SYSTOLIC PRESSURE (RVSP): 31 MMHG
ECHO TV PEAK GRADIENT: 4 MMHG
ECHO TV REGURGITANT MAX VELOCITY: 2.63 M/S
ECHO TV REGURGITANT PEAK GRADIENT: 28 MMHG

## 2024-02-07 PROCEDURE — 93306 TTE W/DOPPLER COMPLETE: CPT

## 2024-02-22 ENCOUNTER — HOSPITAL ENCOUNTER (OUTPATIENT)
Age: 64
Setting detail: OUTPATIENT SURGERY
Discharge: HOME | End: 2024-02-22
Attending: SURGERY | Admitting: SURGERY
Payer: COMMERCIAL

## 2024-02-22 ENCOUNTER — ANESTHESIA (OUTPATIENT)
Dept: OPERATING ROOM | Age: 64
End: 2024-02-22
Payer: COMMERCIAL

## 2024-02-22 ENCOUNTER — ANESTHESIA EVENT (OUTPATIENT)
Dept: OPERATING ROOM | Age: 64
End: 2024-02-22
Payer: COMMERCIAL

## 2024-02-22 VITALS
RESPIRATION RATE: 16 BRPM | HEART RATE: 76 BPM | TEMPERATURE: 97 F | DIASTOLIC BLOOD PRESSURE: 83 MMHG | OXYGEN SATURATION: 96 % | SYSTOLIC BLOOD PRESSURE: 131 MMHG

## 2024-02-22 PROCEDURE — 6360000002 HC RX W HCPCS

## 2024-02-22 PROCEDURE — 7100000011 HC PHASE II RECOVERY - ADDTL 15 MIN: Performed by: SURGERY

## 2024-02-22 PROCEDURE — 2580000003 HC RX 258: Performed by: SURGERY

## 2024-02-22 PROCEDURE — 3700000000 HC ANESTHESIA ATTENDED CARE: Performed by: SURGERY

## 2024-02-22 PROCEDURE — 3700000001 HC ADD 15 MINUTES (ANESTHESIA): Performed by: SURGERY

## 2024-02-22 PROCEDURE — 7100000010 HC PHASE II RECOVERY - FIRST 15 MIN: Performed by: SURGERY

## 2024-02-22 PROCEDURE — 3609027000 HC COLONOSCOPY: Performed by: SURGERY

## 2024-02-22 PROCEDURE — 2709999900 HC NON-CHARGEABLE SUPPLY: Performed by: SURGERY

## 2024-02-22 PROCEDURE — 45378 DIAGNOSTIC COLONOSCOPY: CPT | Performed by: SURGERY

## 2024-02-22 RX ORDER — SODIUM CHLORIDE 0.9 % (FLUSH) 0.9 %
5-40 SYRINGE (ML) INJECTION EVERY 12 HOURS SCHEDULED
Status: DISCONTINUED | OUTPATIENT
Start: 2024-02-22 | End: 2024-02-22 | Stop reason: HOSPADM

## 2024-02-22 RX ORDER — SODIUM CHLORIDE 9 MG/ML
INJECTION, SOLUTION INTRAVENOUS PRN
Status: DISCONTINUED | OUTPATIENT
Start: 2024-02-22 | End: 2024-02-22 | Stop reason: HOSPADM

## 2024-02-22 RX ORDER — PROPOFOL 10 MG/ML
INJECTION, EMULSION INTRAVENOUS CONTINUOUS PRN
Status: DISCONTINUED | OUTPATIENT
Start: 2024-02-22 | End: 2024-02-22 | Stop reason: SDUPTHER

## 2024-02-22 RX ORDER — SODIUM CHLORIDE 0.9 % (FLUSH) 0.9 %
5-40 SYRINGE (ML) INJECTION PRN
Status: DISCONTINUED | OUTPATIENT
Start: 2024-02-22 | End: 2024-02-22 | Stop reason: HOSPADM

## 2024-02-22 RX ORDER — SODIUM CHLORIDE, SODIUM LACTATE, POTASSIUM CHLORIDE, CALCIUM CHLORIDE 600; 310; 30; 20 MG/100ML; MG/100ML; MG/100ML; MG/100ML
INJECTION, SOLUTION INTRAVENOUS CONTINUOUS
Status: DISCONTINUED | OUTPATIENT
Start: 2024-02-22 | End: 2024-02-22 | Stop reason: HOSPADM

## 2024-02-22 RX ADMIN — SODIUM CHLORIDE, POTASSIUM CHLORIDE, SODIUM LACTATE AND CALCIUM CHLORIDE: 600; 310; 30; 20 INJECTION, SOLUTION INTRAVENOUS at 12:03

## 2024-02-22 RX ADMIN — PROPOFOL 150 MCG/KG/MIN: 10 INJECTION, EMULSION INTRAVENOUS at 12:19

## 2024-02-22 RX ADMIN — SODIUM CHLORIDE, POTASSIUM CHLORIDE, SODIUM LACTATE AND CALCIUM CHLORIDE: 600; 310; 30; 20 INJECTION, SOLUTION INTRAVENOUS at 11:10

## 2024-02-22 RX ADMIN — PROPOFOL 80 MG: 10 INJECTION, EMULSION INTRAVENOUS at 12:18

## 2024-02-22 ASSESSMENT — PAIN - FUNCTIONAL ASSESSMENT: PAIN_FUNCTIONAL_ASSESSMENT: ADULT NONVERBAL PAIN SCALE (NPVS)

## 2024-02-22 ASSESSMENT — PAIN SCALES - GENERAL
PAINLEVEL_OUTOF10: 0
PAINLEVEL_OUTOF10: 0

## 2024-02-22 NOTE — OP NOTE
[address] [place] [address]                            OPERATIVE REPORT      PATIENT NAME: ROGERS MORELOS           : 1960  MED REC NO: [id number]                         ROOM: [facility]  ACCOUNT NO: [id number]                       ADMIT DATE: 2024  PROVIDER: Sanchez Arteaga DO      DATE OF PROCEDURE:  2024    SURGEON:  Sanchez Arteaga DO    ASSISTANT:  None.    PREOPERATIVE DIAGNOSES:    1. Screening.  2. History of colon polyps.    POSTOPERATIVE DIAGNOSES:    1. Screening.  2. History of colon polyps.  3. Whole colon diverticulosis.  4. Internal hemorrhoids.    PROCEDURE:  Colonoscopy.    ANESTHESIA:  MAC.    ESTIMATED BLOOD LOSS:  Minimal.    FLUIDS:  Per Anesthesia record.    COMPLICATIONS:  None.    SPECIMENS:  None.    INDICATION FOR PROCEDURE:  The patient is a 63-year-old gentleman, referred to my office for a repeat screening colonoscopy.  After evaluation, decision was made to proceed.  Prior to the time of the procedure, risks, benefits, and alternatives were explained to the patient and consent was obtained.    DESCRIPTION OF PROCEDURE:  The patient was brought to the endoscopy suite, kept on preop gurney, placed in the left lateral decubitus position.  Monitoring devices were placed.  MAC anesthesia was induced.  After induction of anesthesia, a time-out was performed and correct patient and procedure were verified.  Digital rectal exam was performed, which showed no abnormalities.  The Olympus video endoscope was lubricated and inserted into the patient's rectum, which was gently insufflated with air.  The scope was then slowly advanced through the colon under visualization to the level of cecum, which was identified by appendiceal orifice and ileocecal valve.  During advancement of the scope, bowel prep was adequate.  The scope was then withdrawn through the colon with withdrawal time being greater

## 2024-02-22 NOTE — H&P
Subjective   Marino Jesus is a 63 y.o. male who presents today for repeat screening colonoscopy.  Last was 10 years ago.  Denies any recent changes in bowel movements, blood per rectum, melena or unintended weight loss.  No reported family hx of colon cancer.    Past Medical History:   Diagnosis Date    Arthritis     Carpal tunnel syndrome     EMG 11/07, bilaterally, right greater than left, as well as left ulnar neuropathy.     Dyslipidemia     Mild.    History of vertigo     2 years ago (Written 01/31/2020).    Impaired fasting glucose     Borderline diabetes. Diet controlled per pt.     PONV (postoperative nausea and vomiting)        Past Surgical History:   Procedure Laterality Date    ABLATION SAPHENOUS VEIN W/ RFA Left 02/28/2020    LEFT SAPHENOUS VEIN ABLATION  WITH PHLEBECTOMIES  RADIOFREQUENCY ENDOSCOPIC-  VAS TECH performed by Arthur Delos Reyes, MD at [facility]    ANTERIOR CRUCIATE LIGAMENT REPAIR Left 02/1999    COLONOSCOPY N/A 11/05/2010    normal, performed by Dr. Adam @ [facility] ([place], [place])    OTHER SURGICAL HISTORY N/A 06/23/1978    Verrucae vulgaris, palm of right hand.     VASCULAR SURGERY Left 02/28/2020    Left Saphenous Vein Ablation With Phlebectomies Radiofrequency Endoscopic- Vas Tech       No current facility-administered medications for this encounter.     Current Outpatient Medications   Medication Sig Dispense Refill    bisacodyl 5 MG EC tablet Take two tablets at noon day before procedure. 2 tablet 0       No Known Allergies    Family History   Problem Relation Age of Onset    Heart Attack Father     Diabetes Father     Prostate Cancer Brother     Colon Cancer Neg Hx        Social History     Socioeconomic History    Marital status:      Spouse name: Not on file    Number of children: Not on file    Years of education: Not on file    Highest education level: Not on file   Occupational History    Not on file   Tobacco Use    Smoking status: Never    Smokeless tobacco: Never   Vaping

## 2024-02-22 NOTE — DISCHARGE INSTRUCTIONS
Discharge Instructions for Colonoscopy     Colonoscopy is a visual exam of the lining of the large intestine, also called the bowel or colon, with a colonoscope. A colonoscope is a flexible tube with a light and a viewing device. It allows the doctor to view the inside of the colon through a tiny video camera.   Colonoscopy is performed for many reasons: unexplained anemia , pain, diarrhea , bloody stools, cancer screening, among many other reasons.   Complications from a colonoscopy are rare. Some possible serious complications include perforated bowel (which might require surgery) and bleeding (which could require blood transfusion ). Minor complications include bloating, gas, and cramping that can last for 1-2 days after the procedure.   Because air is put into your colon during the procedure, it is normal to pass large amounts of air from your rectum. You may not have a bowel movement for 1-3 days after the procedure.   What You Will Need   Someone to drive you home after the procedure    Steps to Take   Home Care    Rest when you get home.    Because the sedative will make you drowsy, don't drive, operate machinery, or make important decisions the day of the procedure.      Feelings of bloating, gas, or cramping may persist for 24 hours.   Diet    Try sips of water first. If tolerated, resume regular diet or the diet recommended by your physician.    Do not drink alcohol for 24 hours.    Physical Activity    Ask your doctor when you will be able to return to work.    Do not drive, operate heavy machinery, or do activities that require coordination or balance for 24 hours.    Otherwise, return to your normal routine as soon as you are comfortable to do so, which is usually the next day after the procedure.    Medications    When taking medications, it's important to:   Take your medication as directednot more, not less, not at a different time.   Do not stop taking them without consulting your healthcare

## 2024-02-22 NOTE — ANESTHESIA PRE PROCEDURE
Department of Anesthesiology  Preprocedure Note       Name:  Marino Jesus   Age:  63 y.o.  :  1960                                          MRN:  [id number]         Date:  2024      Surgeon: Surgeon(s):  Sanchez Arteaga DO    Procedure: Procedure(s):  Colonoscopy    Medications prior to admission:   Prior to Admission medications    Medication Sig Start Date End Date Taking? Authorizing Provider   bisacodyl 5 MG EC tablet Take two tablets at noon day before procedure. 24   Sanchez Arteaga DO       Current medications:    No current facility-administered medications for this encounter.     Current Outpatient Medications   Medication Sig Dispense Refill    bisacodyl 5 MG EC tablet Take two tablets at noon day before procedure. 2 tablet 0       Allergies:  No Known Allergies    Problem List:    Patient Active Problem List   Diagnosis Code    Dyslipidemia E78.5    Impaired fasting glucose R73.01    Varicose veins of left lower extremity I83.92    Chronic pain of left knee M25.562, G89.29    Increased prostate specific antigen (PSA) velocity R97.20    Venous insufficiency (chronic) (peripheral) I87.2       Past Medical History:        Diagnosis Date    Arthritis     Carpal tunnel syndrome     EMG , bilaterally, right greater than left, as well as left ulnar neuropathy.     Dyslipidemia     Mild.    History of vertigo     2 years ago (Written 2020).    Impaired fasting glucose     Borderline diabetes. Diet controlled per pt.     PONV (postoperative nausea and vomiting)        Past Surgical History:        Procedure Laterality Date    ABLATION SAPHENOUS VEIN W/ RFA Left 2020    LEFT SAPHENOUS VEIN ABLATION  WITH PHLEBECTOMIES  RADIOFREQUENCY ENDOSCOPIC-  VAS TECH performed by Arthur Delos Reyes, MD at [facility] OR    ANTERIOR CRUCIATE LIGAMENT REPAIR Left 1999    COLONOSCOPY N/A 2010    normal, performed by Dr. Adam @ [facility] [place]    OTHER SURGICAL HISTORY N/A 1978

## 2024-02-22 NOTE — ANESTHESIA POSTPROCEDURE EVALUATION
Department of Anesthesiology  Postprocedure Note    Patient: Marino Jesus  MRN: [id number]  YOB: 1960  Date of evaluation: 2/22/2024    Procedure Summary       Date: 02/22/24 Room / Location: [facility]    Anesthesia Start: 1215 Anesthesia Stop: 1240    Procedure: Colonoscopy (Anus) Diagnosis:       Screening for colon cancer      (Screening for colon cancer [Z12.11])    Surgeons: Sanchez Arteaga DO Responsible Provider: Justin Wylie APRN - CRNA    Anesthesia Type: General, TIVA ASA Status: 3            Anesthesia Type: General, TIVA    Jackie Phase I: Jackie Score: 10    Jackie Phase II:      Anesthesia Post Evaluation    Patient location during evaluation: PACU  Patient participation: complete - patient participated  Level of consciousness: responsive to light touch  Pain score: 0  Airway patency: patent  Nausea & Vomiting: no nausea  Cardiovascular status: hemodynamically stable  Respiratory status: room air  Hydration status: euvolemic  Pain management: adequate and satisfactory to patient    No notable events documented.

## 2024-02-23 PROBLEM — Z12.11 SCREEN FOR COLON CANCER: Status: ACTIVE | Noted: 2024-02-23

## 2024-03-24 PROBLEM — Z12.11 SCREEN FOR COLON CANCER: Status: RESOLVED | Noted: 2024-02-23 | Resolved: 2024-03-24

## 2024-07-02 ENCOUNTER — HOSPITAL ENCOUNTER (OUTPATIENT)
Age: 64
Discharge: HOME | End: 2024-07-02
Payer: COMMERCIAL

## 2024-07-02 DIAGNOSIS — R97.20 ELEVATED PSA: ICD-10-CM

## 2024-07-02 DIAGNOSIS — E78.5 DYSLIPIDEMIA: ICD-10-CM

## 2024-07-02 LAB
CHOLEST SERPL-MCNC: 171 MG/DL (ref 0–199)
CHOLESTEROL/HDL RATIO: 3
HDLC SERPL-MCNC: 55 MG/DL
LDLC SERPL CALC-MCNC: 102 MG/DL (ref 0–100)
PSA SERPL-MCNC: 6 NG/ML (ref 0–4)
TRIGL SERPL-MCNC: 69 MG/DL
VLDLC SERPL CALC-MCNC: 14 MG/DL

## 2024-07-02 PROCEDURE — 36415 COLL VENOUS BLD VENIPUNCTURE: CPT

## 2024-07-02 PROCEDURE — 80061 LIPID PANEL: CPT

## 2024-07-02 PROCEDURE — 84153 ASSAY OF PSA TOTAL: CPT

## 2025-02-28 ENCOUNTER — TELEPHONE (OUTPATIENT)
Dept: INTERNAL MEDICINE | Age: 65
End: 2025-02-28

## 2025-02-28 NOTE — TELEPHONE ENCOUNTER
Patient calling in to make yearly appointment and is requesting to have labs ordered prior. Please advise    Please let patient know if these are fasting

## 2025-03-03 DIAGNOSIS — E78.5 DYSLIPIDEMIA: ICD-10-CM

## 2025-03-03 DIAGNOSIS — R73.01 IMPAIRED FASTING GLUCOSE: ICD-10-CM

## 2025-03-03 DIAGNOSIS — Z12.5 SCREENING FOR PROSTATE CANCER: Primary | ICD-10-CM

## 2025-03-03 NOTE — TELEPHONE ENCOUNTER
Ordered, eating can make the cholesterol higher than what it is, therefore I would recommend that he fasts.  If he is not able to, then we might be able to manage if he has to eat

## 2025-03-21 ENCOUNTER — HOSPITAL ENCOUNTER (OUTPATIENT)
Age: 65
Discharge: HOME | End: 2025-03-21
Payer: COMMERCIAL

## 2025-03-21 DIAGNOSIS — R73.01 IMPAIRED FASTING GLUCOSE: ICD-10-CM

## 2025-03-21 DIAGNOSIS — E78.5 DYSLIPIDEMIA: ICD-10-CM

## 2025-03-21 DIAGNOSIS — Z12.5 SCREENING FOR PROSTATE CANCER: ICD-10-CM

## 2025-03-21 LAB
ALBUMIN SERPL-MCNC: 4.4 G/DL (ref 3.5–5.2)
ALBUMIN/GLOB SERPL: 1.6 {RATIO} (ref 1–2.5)
ALP SERPL-CCNC: 83 U/L (ref 40–129)
ALT SERPL-CCNC: 21 U/L (ref 5–41)
ANION GAP SERPL CALCULATED.3IONS-SCNC: 9 MMOL/L (ref 9–17)
AST SERPL-CCNC: 17 U/L
BASOPHILS # BLD: <0.03 K/UL (ref 0–0.2)
BASOPHILS NFR BLD: 0 % (ref 0–2)
BILIRUB SERPL-MCNC: 0.6 MG/DL (ref 0.3–1.2)
BUN SERPL-MCNC: 20 MG/DL (ref 8–23)
BUN/CREAT SERPL: 20 (ref 9–20)
CALCIUM SERPL-MCNC: 9.4 MG/DL (ref 8.6–10.4)
CHLORIDE SERPL-SCNC: 104 MMOL/L (ref 98–107)
CHOLEST SERPL-MCNC: 176 MG/DL (ref 0–199)
CHOLESTEROL/HDL RATIO: 3.7
CO2 SERPL-SCNC: 28 MMOL/L (ref 20–31)
CREAT SERPL-MCNC: 1 MG/DL (ref 0.7–1.2)
EOSINOPHIL # BLD: 0.11 K/UL (ref 0–0.44)
EOSINOPHILS RELATIVE PERCENT: 2 % (ref 1–4)
ERYTHROCYTE [DISTWIDTH] IN BLOOD BY AUTOMATED COUNT: 12.9 % (ref 11.8–14.4)
EST. AVERAGE GLUCOSE BLD GHB EST-MCNC: 126 MG/DL
GFR, ESTIMATED: 84 ML/MIN/1.73M2
GLUCOSE SERPL-MCNC: 106 MG/DL (ref 70–99)
HBA1C MFR BLD: 6 % (ref 4–6)
HCT VFR BLD AUTO: 48.5 % (ref 40.7–50.3)
HDLC SERPL-MCNC: 48 MG/DL
HGB BLD-MCNC: 16 G/DL (ref 13–17)
IMM GRANULOCYTES # BLD AUTO: <0.03 K/UL (ref 0–0.3)
IMM GRANULOCYTES NFR BLD: 0 %
LDLC SERPL CALC-MCNC: 113 MG/DL (ref 0–100)
LYMPHOCYTES NFR BLD: 0.72 K/UL (ref 1.1–3.7)
LYMPHOCYTES RELATIVE PERCENT: 12 % (ref 24–43)
MCH RBC QN AUTO: 28.5 PG (ref 25.2–33.5)
MCHC RBC AUTO-ENTMCNC: 33 G/DL (ref 25.2–33.5)
MCV RBC AUTO: 86.5 FL (ref 82.6–102.9)
MONOCYTES NFR BLD: 0.51 K/UL (ref 0.1–1.2)
MONOCYTES NFR BLD: 9 % (ref 3–12)
NEUTROPHILS NFR BLD: 77 % (ref 36–65)
NEUTS SEG NFR BLD: 4.49 K/UL (ref 1.5–8.1)
NRBC BLD-RTO: 0 PER 100 WBC
PLATELET # BLD AUTO: 305 K/UL (ref 138–453)
PMV BLD AUTO: 10.3 FL (ref 8.1–13.5)
POTASSIUM SERPL-SCNC: 4.2 MMOL/L (ref 3.7–5.3)
PROT SERPL-MCNC: 7.2 G/DL (ref 6.4–8.3)
PSA SERPL-MCNC: 6.35 NG/ML (ref 0–4)
RBC # BLD AUTO: 5.61 M/UL (ref 4.21–5.77)
SODIUM SERPL-SCNC: 141 MMOL/L (ref 135–144)
TRIGL SERPL-MCNC: 77 MG/DL
VLDLC SERPL CALC-MCNC: 15 MG/DL (ref 1–30)
WBC OTHER # BLD: 5.9 K/UL (ref 3.5–11.3)

## 2025-03-21 PROCEDURE — 36415 COLL VENOUS BLD VENIPUNCTURE: CPT

## 2025-03-21 PROCEDURE — G0103 PSA SCREENING: HCPCS

## 2025-03-21 PROCEDURE — 80053 COMPREHEN METABOLIC PANEL: CPT

## 2025-03-21 PROCEDURE — 80061 LIPID PANEL: CPT

## 2025-03-21 PROCEDURE — 83036 HEMOGLOBIN GLYCOSYLATED A1C: CPT

## 2025-03-21 PROCEDURE — 85025 COMPLETE CBC W/AUTO DIFF WBC: CPT

## 2025-04-03 ENCOUNTER — RESULTS FOLLOW-UP (OUTPATIENT)
Dept: INTERNAL MEDICINE | Age: 65
End: 2025-04-03

## 2025-04-03 ENCOUNTER — OFFICE VISIT (OUTPATIENT)
Dept: INTERNAL MEDICINE | Age: 65
End: 2025-04-03
Payer: COMMERCIAL

## 2025-04-03 VITALS
DIASTOLIC BLOOD PRESSURE: 80 MMHG | SYSTOLIC BLOOD PRESSURE: 116 MMHG | BODY MASS INDEX: 32.89 KG/M2 | HEIGHT: 68 IN | WEIGHT: 217 LBS | HEART RATE: 60 BPM | RESPIRATION RATE: 16 BRPM

## 2025-04-03 DIAGNOSIS — Z00.00 ENCOUNTER FOR WELL ADULT EXAM WITHOUT ABNORMAL FINDINGS: ICD-10-CM

## 2025-04-03 DIAGNOSIS — Z12.5 SCREENING FOR PROSTATE CANCER: ICD-10-CM

## 2025-04-03 DIAGNOSIS — E78.5 DYSLIPIDEMIA: ICD-10-CM

## 2025-04-03 DIAGNOSIS — E66.9 OBESITY, UNSPECIFIED CLASS, UNSPECIFIED OBESITY TYPE, UNSPECIFIED WHETHER SERIOUS COMORBIDITY PRESENT: Primary | ICD-10-CM

## 2025-04-03 DIAGNOSIS — R73.01 IMPAIRED FASTING GLUCOSE: ICD-10-CM

## 2025-04-03 PROCEDURE — 99396 PREV VISIT EST AGE 40-64: CPT | Performed by: INTERNAL MEDICINE

## 2025-04-03 SDOH — ECONOMIC STABILITY: FOOD INSECURITY: WITHIN THE PAST 12 MONTHS, YOU WORRIED THAT YOUR FOOD WOULD RUN OUT BEFORE YOU GOT MONEY TO BUY MORE.: NEVER TRUE

## 2025-04-03 SDOH — ECONOMIC STABILITY: FOOD INSECURITY: WITHIN THE PAST 12 MONTHS, THE FOOD YOU BOUGHT JUST DIDN'T LAST AND YOU DIDN'T HAVE MONEY TO GET MORE.: NEVER TRUE

## 2025-04-03 ASSESSMENT — PATIENT HEALTH QUESTIONNAIRE - PHQ9
1. LITTLE INTEREST OR PLEASURE IN DOING THINGS: NOT AT ALL
2. FEELING DOWN, DEPRESSED OR HOPELESS: NOT AT ALL
SUM OF ALL RESPONSES TO PHQ QUESTIONS 1-9: 0

## 2025-04-03 NOTE — PROGRESS NOTES
Well Adult Note  Name: Marino Jesus Today’s Date: 4/3/2025   MRN: [id number] Sex: Male   Age: 64 y.o. Ethnicity: Non- / Non    : 1960 Race: White (non-)      Marino Jesus is here for well adult exam.  History:   He says that he is doing okay at this time, he is concerned about his weight gain.  He is interested in seeing a nutritionist.  I advised that I can refer him.  I also counseled him about intermittent fasting, increasing his fruit and vegetable intake, healthy eating habits in general.  Moreover, his PSA is slightly higher than before, I advised that I can refer to a urologist, but he seems to be in preference of just monitoring his PSA at this time.  He denies dysuria, hematuria.  No weight loss.    Review of Systems    No Known Allergies      Prior to Visit Medications    Not on File         Past Medical History:   Diagnosis Date    Arthritis     Carpal tunnel syndrome     EMG , bilaterally, right greater than left, as well as left ulnar neuropathy.     Dyslipidemia     Mild.    History of vertigo     2 years ago (Written 2020).    Impaired fasting glucose     Borderline diabetes. Diet controlled per pt.     PONV (postoperative nausea and vomiting)        Past Surgical History:   Procedure Laterality Date    ABLATION SAPHENOUS VEIN W/ RFA Left 2020    LEFT SAPHENOUS VEIN ABLATION  WITH PHLEBECTOMIES  RADIOFREQUENCY ENDOSCOPIC-  VAS TECH performed by Arthur Delos Reyes, MD at [facility] OR    ANTERIOR CRUCIATE LIGAMENT REPAIR Left 1999    COLONOSCOPY N/A 2010    [place] [facility] [place]ONOSCOPY N/A 2024    Colonoscopy performed by Sanchez Arteaga DO at [facility] OR    OTHER SURGICAL HISTORY N/A 1978    Verrucae vulgaris, palm of right hand.     VASCULAR SURGERY Left 2020    Left Saphenous Vein Ablation With Phlebectomies Radiofrequency Endoscopic- Vas Tech         Family History   Problem Relation Age

## 2025-07-30 ENCOUNTER — TELEPHONE (OUTPATIENT)
Dept: INTERNAL MEDICINE | Age: 65
End: 2025-07-30

## 2025-07-30 ENCOUNTER — HOSPITAL ENCOUNTER (OUTPATIENT)
Dept: LAB | Age: 65
Discharge: HOME | End: 2025-07-30
Payer: COMMERCIAL

## 2025-07-30 DIAGNOSIS — R97.20 ELEVATED PSA: ICD-10-CM

## 2025-07-30 DIAGNOSIS — R97.20 ELEVATED PSA: Primary | ICD-10-CM

## 2025-07-30 LAB — PSA SERPL-MCNC: 6.64 NG/ML (ref 0–4)

## 2025-07-30 PROCEDURE — 36415 COLL VENOUS BLD VENIPUNCTURE: CPT

## 2025-07-30 PROCEDURE — 84153 ASSAY OF PSA TOTAL: CPT

## 2025-07-30 NOTE — TELEPHONE ENCOUNTER
Pt reports that Dr. Sweet wanted to check his PSA before next year due to it being elevated 3/21/25 (and previous years).  Dr. Sweet also recommended Urology consult, but pt wants to wait until this next result.  Pt wants PSA today. Informed pt that insurance will only cover a Screening PSA once a year. States \"Well, it can't cost that much.\"  Dx PSA order placed.

## (undated) DEVICE — CHLORAPREP 26ML ORANGE

## (undated) DEVICE — CO2 CANNULA,SSOFT,ADLT,7O2,4CO2,FEMALE: Brand: MEDLINE

## (undated) DEVICE — PACK PROC VASC CLSR ENDOVENOUS CUST CLSRFAST

## (undated) DEVICE — BANDAGE,GAUZE,BULKEE II,4.5"X4.1YD,STRL: Brand: MEDLINE

## (undated) DEVICE — GLOVE SURG SZ 65 CRM LTX FREE POLYISOPRENE POLYMER BEAD ANTI

## (undated) DEVICE — CATHETER ABLAT 7FR L100CM 0.025IN ENDOVENOUS RF CLOSUREFAST

## (undated) DEVICE — SYRINGE, LUER SLIP, STERILE, 3ML: Brand: MEDLINE

## (undated) DEVICE — INFILTRATION TUBING SET: Brand: SINGLE SPIKE INFILTRATION TUBING

## (undated) DEVICE — SUTURE VCRL SZ 3-0 L27IN ABSRB UD L26MM SH 1/2 CIR J416H

## (undated) DEVICE — COVER LT HNDL BLU PLAS

## (undated) DEVICE — CHIBA BIOPSY NEEDLE: Brand: COOK

## (undated) DEVICE — BANDAGE COBAN 4 IN COMPR W4INXL5YD FOAM COHESIVE QUIK STK SELF ADH SFT

## (undated) DEVICE — DRAPE SURG EQUIP W102XL51CM E CIR SEWN BND BG

## (undated) DEVICE — GLOVE SURG SZ 75 CRM LTX FREE POLYISOPRENE POLYMER BEAD ANTI

## (undated) DEVICE — GAUZE,SPONGE,FLUFF,6"X6.75",STRL,5/TRAY: Brand: MEDLINE

## (undated) DEVICE — NDL CNTR 40CT FM MAG: Brand: MEDLINE INDUSTRIES, INC.

## (undated) DEVICE — MERCY DEFIANCE ENDO KIT: Brand: MEDLINE INDUSTRIES, INC.

## (undated) DEVICE — SHEATH MICROINTRODUCER 7FR L7CM CLOSUREFAST

## (undated) DEVICE — 4-PORT MANIFOLD: Brand: NEPTUNE 2

## (undated) DEVICE — TOWEL,OR,DSP,ST,BLUE,DLX,XR,4/PK,20PK/CS: Brand: MEDLINE

## (undated) DEVICE — SHEET, T, LAPAROTOMY, STERILE: Brand: MEDLINE

## (undated) DEVICE — GEL US 20GM NONIRRITATING OVERWRAPPED FILE PCH TRNSMIT